# Patient Record
Sex: MALE | Race: WHITE | NOT HISPANIC OR LATINO | Employment: OTHER | URBAN - METROPOLITAN AREA
[De-identification: names, ages, dates, MRNs, and addresses within clinical notes are randomized per-mention and may not be internally consistent; named-entity substitution may affect disease eponyms.]

---

## 2017-03-08 PROBLEM — Z72.0 TOBACCO ABUSE: Status: ACTIVE | Noted: 2017-03-08

## 2017-03-10 ENCOUNTER — OFFICE VISIT (OUTPATIENT)
Dept: FAMILY MEDICINE CLINIC | Facility: CLINIC | Age: 62
End: 2017-03-10

## 2017-03-10 VITALS
OXYGEN SATURATION: 95 % | SYSTOLIC BLOOD PRESSURE: 140 MMHG | HEIGHT: 70 IN | HEART RATE: 70 BPM | RESPIRATION RATE: 18 BRPM | TEMPERATURE: 97.8 F | WEIGHT: 209 LBS | DIASTOLIC BLOOD PRESSURE: 76 MMHG | BODY MASS INDEX: 29.92 KG/M2

## 2017-03-10 DIAGNOSIS — N40.0 BENIGN NON-NODULAR PROSTATIC HYPERPLASIA, PRESENCE OF LOWER URINARY TRACT SYMPTOMS UNSPECIFIED: ICD-10-CM

## 2017-03-10 DIAGNOSIS — R68.82 DECREASED LIBIDO: ICD-10-CM

## 2017-03-10 DIAGNOSIS — Z12.11 SCREEN FOR COLON CANCER: ICD-10-CM

## 2017-03-10 DIAGNOSIS — R53.83 FATIGUE, UNSPECIFIED TYPE: Primary | ICD-10-CM

## 2017-03-10 DIAGNOSIS — E78.2 MIXED HYPERLIPIDEMIA: ICD-10-CM

## 2017-03-10 PROCEDURE — 99214 OFFICE O/P EST MOD 30 MIN: CPT | Performed by: FAMILY MEDICINE

## 2017-03-10 RX ORDER — TAMSULOSIN HYDROCHLORIDE 0.4 MG/1
1 CAPSULE ORAL EVERY MORNING
COMMUNITY
Start: 2015-09-14 | End: 2021-12-30 | Stop reason: SDUPTHER

## 2017-03-10 NOTE — PROGRESS NOTES
Chief Complaint   Patient presents with   • Fatigue     all the time, he would like to discuss why extremely tired all time       Subjective     Fatigue   This is a recurrent problem. The current episode started more than 1 year ago (about 1 yr). Associated symptoms include arthralgias (shoulders and elbows hurt but use them alot as well), fatigue and weakness (general). Pertinent negatives include no abdominal pain, chest pain, congestion, coughing, fever, myalgias, numbness or sore throat. Associated symptoms comments: decreased muscle mass. Shortness of breath. decreased libido/. + ED issues as well.   . Nothing aggravates the symptoms. He has tried nothing for the symptoms. The treatment provided no relief.   Hyperlipidemia   This is a chronic problem. The current episode started more than 1 year ago. The problem is uncontrolled. Recent lipid tests were reviewed and are high. He has no history of chronic renal disease, diabetes or hypothyroidism. Factors aggravating his hyperlipidemia include smoking. Pertinent negatives include no chest pain, myalgias or shortness of breath. He is currently on no antihyperlipidemic treatment. There are no compliance problems.  Risk factors for coronary artery disease include male sex.     tob use  hypnotism worked in the past        PHQ-9 Depression Screening 3/10/2017   Little interest or pleasure in doing things 0   Feeling down, depressed, or hopeless 0   Trouble falling or staying asleep, or sleeping too much 1   Feeling tired or having little energy 3   Poor appetite or overeating 0   Feeling bad about yourself - or that you are a failure or have let yourself or your family down 0   Trouble concentrating on things, such as reading the newspaper or watching television 0   Moving or speaking so slowly that other people could have noticed. Or the opposite - being so fidgety or restless that you have been moving around a lot more than usual 0   Thoughts that you would be  better off dead, or of hurting yourself in some way 0   PHQ-9 Total Score 4      Past Medical History,Medications, Allergies, and social history was reviewed.    Review of Systems   Constitutional: Positive for fatigue. Negative for fever.   HENT: Negative.  Negative for congestion and sore throat.    Eyes: Negative.    Respiratory: Negative.  Negative for cough and shortness of breath.    Cardiovascular: Negative.  Negative for chest pain.   Gastrointestinal: Negative.  Negative for abdominal pain.   Endocrine: Negative.    Genitourinary: Negative.    Musculoskeletal: Positive for arthralgias (shoulders and elbows hurt but use them alot as well). Negative for myalgias.   Neurological: Positive for weakness (general). Negative for numbness.   Psychiatric/Behavioral: Negative.        Objective     Physical Exam   Constitutional: He is oriented to person, place, and time. Vital signs are normal. He appears well-developed and well-nourished.   HENT:   Head: Normocephalic and atraumatic.   Right Ear: Hearing, tympanic membrane, external ear and ear canal normal.   Left Ear: Hearing, tympanic membrane, external ear and ear canal normal.   Nose: Nose normal.   Mouth/Throat: Oropharynx is clear and moist.   Eyes: Conjunctivae, EOM and lids are normal. Pupils are equal, round, and reactive to light.   Neck: Normal range of motion. Neck supple. No thyromegaly present.   Cardiovascular: Normal rate, regular rhythm and normal heart sounds.  Exam reveals no friction rub.    No murmur heard.  Pulmonary/Chest: Effort normal and breath sounds normal. No respiratory distress. He has no wheezes. He has no rales.   Abdominal: Soft. Normal appearance and bowel sounds are normal. He exhibits no distension and no mass. There is no tenderness. There is no rebound and no guarding.   Neurological: He is alert and oriented to person, place, and time. He has normal strength.   Skin: Skin is warm and dry.   Psychiatric: He has a normal mood  and affect. His speech is normal and behavior is normal. Cognition and memory are normal.   Nursing note and vitals reviewed.        Assessment/Plan     Problem List Items Addressed This Visit        Cardiovascular and Mediastinum    Mixed hyperlipidemia    Relevant Orders    Comprehensive Metabolic Panel    Lipid Panel With / Chol / HDL Ratio       Genitourinary    Benign non-nodular prostatic hyperplasia    Relevant Medications    tamsulosin (FLOMAX) 0.4 MG capsule 24 hr capsule      Other Visit Diagnoses     Fatigue, unspecified type    -  Primary    Relevant Orders    CBC & Differential    Comprehensive Metabolic Panel    TSH    Testosterone    Vitamin B12    Decreased libido        Relevant Orders    Testosterone    Screen for colon cancer        Relevant Orders    Amb referral for Screening Colonoscopy              DISCUSSION  Persistent fatigue with associated decreased libido.  Check labs as noted.  May have hypogonadism.  Was mildly low but in the normal range in 2015.    BPH.  Stable.  Continue medication.    Elevated triglycerides and cholesterol.  Recheck lipid panel and CMP.    Further plan once we have labs back.    Recommend continue efforts to quit smoking.    Recommend colonoscopy for screening.    MEDICATIONS PRESCRIBED  Requested Prescriptions      No prescriptions requested or ordered in this encounter          Héctor Olivas MD

## 2017-03-11 LAB
ALBUMIN SERPL-MCNC: 4.5 G/DL (ref 3.2–4.8)
ALBUMIN/GLOB SERPL: 1.6 G/DL (ref 1.5–2.5)
ALP SERPL-CCNC: 88 U/L (ref 25–100)
ALT SERPL-CCNC: 30 U/L (ref 7–40)
AST SERPL-CCNC: 24 U/L (ref 0–33)
BASOPHILS # BLD AUTO: 0.05 10*3/MM3 (ref 0–0.2)
BASOPHILS NFR BLD AUTO: 0.5 % (ref 0–1)
BILIRUB SERPL-MCNC: 0.3 MG/DL (ref 0.3–1.2)
BUN SERPL-MCNC: 13 MG/DL (ref 9–23)
BUN/CREAT SERPL: 16.3 (ref 7–25)
CALCIUM SERPL-MCNC: 10 MG/DL (ref 8.7–10.4)
CHLORIDE SERPL-SCNC: 109 MMOL/L (ref 99–109)
CHOLEST SERPL-MCNC: 225 MG/DL (ref 0–200)
CHOLEST/HDLC SERPL: 4.25 {RATIO}
CO2 SERPL-SCNC: 28 MMOL/L (ref 20–31)
CONV COMMENT: ABNORMAL
CREAT SERPL-MCNC: 0.8 MG/DL (ref 0.6–1.3)
EOSINOPHIL # BLD AUTO: 0.19 10*3/MM3 (ref 0.1–0.3)
EOSINOPHIL NFR BLD AUTO: 1.9 % (ref 0–3)
ERYTHROCYTE [DISTWIDTH] IN BLOOD BY AUTOMATED COUNT: 13.5 % (ref 11.3–14.5)
GLOBULIN SER CALC-MCNC: 2.9 GM/DL
GLUCOSE SERPL-MCNC: 95 MG/DL (ref 70–100)
HCT VFR BLD AUTO: 48.8 % (ref 38.9–50.9)
HDLC SERPL-MCNC: 53 MG/DL (ref 40–60)
HGB BLD-MCNC: 16.2 G/DL (ref 13.1–17.5)
IMM GRANULOCYTES # BLD: 0.03 10*3/MM3 (ref 0–0.03)
IMM GRANULOCYTES NFR BLD: 0.3 % (ref 0–0.6)
LDLC SERPL CALC-MCNC: 152 MG/DL (ref 0–100)
LYMPHOCYTES # BLD AUTO: 3.27 10*3/MM3 (ref 0.6–4.8)
LYMPHOCYTES NFR BLD AUTO: 32.2 % (ref 24–44)
MCH RBC QN AUTO: 31.6 PG (ref 27–31)
MCHC RBC AUTO-ENTMCNC: 33.2 G/DL (ref 32–36)
MCV RBC AUTO: 95.1 FL (ref 80–99)
MONOCYTES # BLD AUTO: 0.83 10*3/MM3 (ref 0–1)
MONOCYTES NFR BLD AUTO: 8.2 % (ref 0–12)
NEUTROPHILS # BLD AUTO: 5.78 10*3/MM3 (ref 1.5–8.3)
NEUTROPHILS NFR BLD AUTO: 56.9 % (ref 41–71)
PLATELET # BLD AUTO: 268 10*3/MM3 (ref 150–450)
POTASSIUM SERPL-SCNC: 4.7 MMOL/L (ref 3.5–5.5)
PROT SERPL-MCNC: 7.4 G/DL (ref 5.7–8.2)
RBC # BLD AUTO: 5.13 10*6/MM3 (ref 4.2–5.76)
SODIUM SERPL-SCNC: 142 MMOL/L (ref 132–146)
TESTOST SERPL-MCNC: 298 NG/DL (ref 348–1197)
TRIGL SERPL-MCNC: 101 MG/DL (ref 0–150)
TSH SERPL DL<=0.005 MIU/L-ACNC: 1.73 MIU/ML (ref 0.35–5.35)
VIT B12 SERPL-MCNC: 286 PG/ML (ref 211–911)
VLDLC SERPL CALC-MCNC: 20.2 MG/DL
WBC # BLD AUTO: 10.15 10*3/MM3 (ref 3.5–10.8)

## 2017-03-14 DIAGNOSIS — R79.89 LOW TESTOSTERONE: Primary | ICD-10-CM

## 2017-03-15 LAB
CONV COMMENT: ABNORMAL
LH SERPL-ACNC: 6.3 MIU/ML (ref 1.7–8.6)
PSA SERPL-MCNC: 3.8 NG/ML (ref 0–4)
TESTOST SERPL-MCNC: 324 NG/DL (ref 348–1197)

## 2017-03-17 NOTE — PROGRESS NOTES
Spoke with pt's wife (On consent) and went over results/instructions. Verbalized understanding. Rx called

## 2017-03-23 RX ORDER — TESTOSTERONE 30 MG/1.5ML
SOLUTION TOPICAL
Qty: 90 ML | Refills: 2 | OUTPATIENT
Start: 2017-03-23 | End: 2021-08-19

## 2017-04-04 PROBLEM — E29.1 HYPOGONADISM IN MALE: Status: ACTIVE | Noted: 2017-04-04

## 2017-04-04 PROBLEM — R68.82 REDUCED LIBIDO: Status: ACTIVE | Noted: 2017-04-04

## 2017-04-04 PROBLEM — R53.83 FATIGUE: Status: ACTIVE | Noted: 2017-04-04

## 2017-04-04 PROBLEM — R31.9 HEMATURIA: Status: ACTIVE | Noted: 2017-04-04

## 2018-07-03 ENCOUNTER — OFFICE VISIT (OUTPATIENT)
Dept: RETAIL CLINIC | Facility: CLINIC | Age: 63
End: 2018-07-03

## 2018-07-03 VITALS
WEIGHT: 208 LBS | HEIGHT: 70 IN | HEART RATE: 69 BPM | TEMPERATURE: 98.7 F | OXYGEN SATURATION: 98 % | BODY MASS INDEX: 29.78 KG/M2 | RESPIRATION RATE: 12 BRPM | SYSTOLIC BLOOD PRESSURE: 126 MMHG | DIASTOLIC BLOOD PRESSURE: 78 MMHG

## 2018-07-03 DIAGNOSIS — R05.3 COUGH, PERSISTENT: ICD-10-CM

## 2018-07-03 DIAGNOSIS — J06.9 UPPER RESPIRATORY TRACT INFECTION, UNSPECIFIED TYPE: Primary | ICD-10-CM

## 2018-07-03 PROCEDURE — 99213 OFFICE O/P EST LOW 20 MIN: CPT | Performed by: NURSE PRACTITIONER

## 2018-07-03 RX ORDER — PREDNISONE 20 MG/1
20 TABLET ORAL DAILY
Qty: 3 TABLET | Refills: 0 | Status: SHIPPED | OUTPATIENT
Start: 2018-07-03 | End: 2018-07-06

## 2018-07-03 RX ORDER — TAMSULOSIN HYDROCHLORIDE 0.4 MG/1
1 CAPSULE ORAL NIGHTLY
COMMUNITY
End: 2021-08-19 | Stop reason: SDUPTHER

## 2018-07-03 RX ORDER — CEPHALEXIN 500 MG/1
500 CAPSULE ORAL 2 TIMES DAILY
Qty: 20 CAPSULE | Refills: 0 | Status: SHIPPED | OUTPATIENT
Start: 2018-07-03 | End: 2018-07-13

## 2018-07-03 RX ORDER — ASPIRIN 81 MG/1
81 TABLET ORAL DAILY
COMMUNITY
End: 2021-08-19 | Stop reason: SDUPTHER

## 2018-07-03 RX ORDER — PREDNISONE 10 MG/1
TABLET ORAL
Qty: 21 TABLET | Refills: 0 | Status: SHIPPED | OUTPATIENT
Start: 2018-07-03 | End: 2018-07-03

## 2018-07-03 RX ORDER — ALBUTEROL SULFATE 90 UG/1
2 AEROSOL, METERED RESPIRATORY (INHALATION) EVERY 4 HOURS PRN
Qty: 1 INHALER | Refills: 0 | Status: SHIPPED | OUTPATIENT
Start: 2018-07-03 | End: 2021-08-19

## 2018-07-03 NOTE — PATIENT INSTRUCTIONS
"Upper Respiratory Infection, Adult  Most upper respiratory infections (URIs) are a viral infection of the air passages leading to the lungs. A URI affects the nose, throat, and upper air passages. The most common type of URI is nasopharyngitis and is typically referred to as \"the common cold.\"  URIs run their course and usually go away on their own. Most of the time, a URI does not require medical attention, but sometimes a bacterial infection in the upper airways can follow a viral infection. This is called a secondary infection. Sinus and middle ear infections are common types of secondary upper respiratory infections.  Bacterial pneumonia can also complicate a URI. A URI can worsen asthma and chronic obstructive pulmonary disease (COPD). Sometimes, these complications can require emergency medical care and may be life threatening.  What are the causes?  Almost all URIs are caused by viruses. A virus is a type of germ and can spread from one person to another.  What increases the risk?  You may be at risk for a URI if:  · You smoke.  · You have chronic heart or lung disease.  · You have a weakened defense (immune) system.  · You are very young or very old.  · You have nasal allergies or asthma.  · You work in crowded or poorly ventilated areas.  · You work in health care facilities or schools.    What are the signs or symptoms?  Symptoms typically develop 2-3 days after you come in contact with a cold virus. Most viral URIs last 7-10 days. However, viral URIs from the influenza virus (flu virus) can last 14-18 days and are typically more severe. Symptoms may include:  · Runny or stuffy (congested) nose.  · Sneezing.  · Cough.  · Sore throat.  · Headache.  · Fatigue.  · Fever.  · Loss of appetite.  · Pain in your forehead, behind your eyes, and over your cheekbones (sinus pain).  · Muscle aches.    How is this diagnosed?  Your health care provider may diagnose a URI by:  · Physical exam.  · Tests to check that your " symptoms are not due to another condition such as:  ? Strep throat.  ? Sinusitis.  ? Pneumonia.  ? Asthma.    How is this treated?  A URI goes away on its own with time. It cannot be cured with medicines, but medicines may be prescribed or recommended to relieve symptoms. Medicines may help:  · Reduce your fever.  · Reduce your cough.  · Relieve nasal congestion.    Follow these instructions at home:  · Take medicines only as directed by your health care provider.  · Gargle warm saltwater or take cough drops to comfort your throat as directed by your health care provider.  · Use a warm mist humidifier or inhale steam from a shower to increase air moisture. This may make it easier to breathe.  · Drink enough fluid to keep your urine clear or pale yellow.  · Eat soups and other clear broths and maintain good nutrition.  · Rest as needed.  · Return to work when your temperature has returned to normal or as your health care provider advises. You may need to stay home longer to avoid infecting others. You can also use a face mask and careful hand washing to prevent spread of the virus.  · Increase the usage of your inhaler if you have asthma.  · Do not use any tobacco products, including cigarettes, chewing tobacco, or electronic cigarettes. If you need help quitting, ask your health care provider.  How is this prevented?  The best way to protect yourself from getting a cold is to practice good hygiene.  · Avoid oral or hand contact with people with cold symptoms.  · Wash your hands often if contact occurs.    There is no clear evidence that vitamin C, vitamin E, echinacea, or exercise reduces the chance of developing a cold. However, it is always recommended to get plenty of rest, exercise, and practice good nutrition.  Contact a health care provider if:  · You are getting worse rather than better.  · Your symptoms are not controlled by medicine.  · You have chills.  · You have worsening shortness of breath.  · You have  brown or red mucus.  · You have yellow or brown nasal discharge.  · You have pain in your face, especially when you bend forward.  · You have a fever.  · You have swollen neck glands.  · You have pain while swallowing.  · You have white areas in the back of your throat.  Get help right away if:  · You have severe or persistent:  ? Headache.  ? Ear pain.  ? Sinus pain.  ? Chest pain.  · You have chronic lung disease and any of the following:  ? Wheezing.  ? Prolonged cough.  ? Coughing up blood.  ? A change in your usual mucus.  · You have a stiff neck.  · You have changes in your:  ? Vision.  ? Hearing.  ? Thinking.  ? Mood.  This information is not intended to replace advice given to you by your health care provider. Make sure you discuss any questions you have with your health care provider.  Document Released: 06/13/2002 Document Revised: 08/20/2017 Document Reviewed: 03/25/2015  [email or web address] Interactive Patient Education © 2018 [email or web address]    How to Use a Metered Dose Inhaler  A metered dose inhaler is a handheld device for taking medicine that must be breathed into the lungs (inhaled). The device can be used to deliver a variety of inhaled medicines, including:  · Quick relief or rescue medicines, such as bronchodilators.  · Controller medicines, such as corticosteroids.    The medicine is delivered by pushing down on a metal canister to release a preset amount of spray and medicine. Each device contains the amount of medicine that is needed for a preset number of uses (inhalations).  Your health care provider may recommend that you use a spacer with your inhaler to help you take the medicine more effectively. A spacer is a plastic tube with a mouthpiece on one end and an opening that connects to the inhaler on the other end. A spacer holds the medicine in a tube for a short time, which allows you to inhale more medicine.  What are the risks?  If you do not use your inhaler correctly, medicine might not reach  your lungs to help you breathe.  Inhaler medicine can cause side effects, such as:  · Mouth or throat infection.  · Cough.  · Hoarseness.  · Headache.  · Nausea and vomiting.  · Lung infection (pneumonia) in people who have a lung condition called COPD.    How to use a metered dose inhaler without a spacer  1. Remove the cap from the inhaler.  2. If you are using the inhaler for the first time, shake it for 5 seconds, turn it away from your face, then release 4 puffs into the air. This is called priming.  3. Shake the inhaler for 5 seconds.  4. Position the inhaler so the top of the canister faces up.  5. Put your index finger on the top of the medicine canister. Support the bottom of the inhaler with your thumb.  6. Breathe out normally and as completely as possible, away from the inhaler.  7. Either place the inhaler between your teeth and close your lips tightly around the mouthpiece, or hold the inhaler 1-2 inches (2.5-5 cm) away from your open mouth. Keep your tongue down out of the way. If you are unsure which technique to use, ask your health care provider.  8. Press the canister down with your index finger to release the medicine, then inhale deeply and slowly through your mouth (not your nose) until your lungs are completely filled. Inhaling should take 4-6 seconds.  9. Hold the medicine in your lungs for 5-10 seconds (10 seconds is best). This helps the medicine get into the small airways of your lungs.  10. With your lips in a tight [place] (pursed), breathe out slowly.  11. Repeat steps 3-10 until you have taken the number of puffs that your health care provider directed. Wait about 1 minute between puffs or as directed.  12. Put the cap on the inhaler.  13. If you are using a steroid inhaler, rinse your mouth with water, gargle, and spit out the water. Do not swallow the water.  How to use a metered dose inhaler with a spacer  1. Remove the cap from the inhaler.  2. If you are using the inhaler for the  first time, shake it for 5 seconds, turn it away from your face, then release 4 puffs into the air. This is called priming.  3. Shake the inhaler for 5 seconds.  4. Place the open end of the spacer onto the inhaler mouthpiece.  5. Position the inhaler so the top of the canister faces up and the spacer mouthpiece faces you.  6. Put your index finger on the top of the medicine canister. Support the bottom of the inhaler and the spacer with your thumb.  7. Breathe out normally and as completely as possible, away from the spacer.  8. Place the spacer between your teeth and close your lips tightly around it. Keep your tongue down out of the way.  9. Press the canister down with your index finger to release the medicine, then inhale deeply and slowly through your mouth (not your nose) until your lungs are completely filled. Inhaling should take 4-6 seconds.  10. Hold the medicine in your lungs for 5-10 seconds (10 seconds is best). This helps the medicine get into the small airways of your lungs.  11. With your lips in a tight [place] (pursed), breathe out slowly.  12. Repeat steps 3-11 until you have taken the number of puffs that your health care provider directed. Wait about 1 minute between puffs or as directed.  13. Remove the spacer from the inhaler and put the cap on the inhaler.  14. If you are using a steroid inhaler, rinse your mouth with water, gargle, and spit out the water. Do not swallow the water.  Follow these instructions at home:  · Take your inhaled medicine only as told by your health care provider. Do not use the inhaler more than directed by your health care provider.  · Keep all follow-up visits as told by your health care provider. This is important.  · If your inhaler has a counter, you can check it to determine how full your inhaler is. If your inhaler does not have a counter, ask your health care provider when you will need to refill your inhaler and write the refill date on a calendar or on your  inhaler canister. Note that you cannot know when an inhaler is empty by shaking it.  · Follow directions on the package insert for care and cleaning of your inhaler and spacer.  Contact a health care provider if:  · Symptoms are only partially relieved with your inhaler.  · You are having trouble using your inhaler.  · You have an increase in phlegm.  · You have headaches.  Get help right away if:  · You feel little or no relief after using your inhaler.  · You have dizziness.  · You have a fast heart rate.  · You have chills or a fever.  · You have night sweats.  · There is blood in your phlegm.  Summary  · A metered dose inhaler is a handheld device for taking medicine that must be breathed into the lungs (inhaled).  · The medicine is delivered by pushing down on a metal canister to release a preset amount of spray and medicine.  · Each device contains the amount of medicine that is needed for a preset number of uses (inhalations).  This information is not intended to replace advice given to you by your health care provider. Make sure you discuss any questions you have with your health care provider.  Document Released: 12/18/2006 Document Revised: 11/07/2017 Document Reviewed: 11/07/2017  [employer] Interactive Patient Education © 2017 [employer]

## 2019-05-26 NOTE — PROGRESS NOTES
"Subjective   Angelo Martin is a 62 y.o. male.   /78   Pulse 69   Temp 98.7 °F (37.1 °C) (Oral)   Resp 12   Ht 177.8 cm (70\")   Wt 94.3 kg (208 lb)   SpO2 98%   BMI 29.84 kg/m²   Past Medical History:   Diagnosis Date   • Asthma     resolved as a small child   • Elevated cholesterol      No Known Allergies    URI    This is a new problem. The current episode started 1 to 4 weeks ago. The problem has been gradually worsening. There has been no fever. Associated symptoms include congestion, coughing (productive and croupy like cough), rhinorrhea and a sore throat (waxes and wanes). Pertinent negatives include no abdominal pain or chest pain.        The following portions of the patient's history were reviewed and updated as appropriate: allergies, current medications, past family history, past medical history, past social history, past surgical history and problem list.    Review of Systems   Constitutional: Positive for activity change and fatigue. Negative for appetite change and fever.   HENT: Positive for congestion, postnasal drip, rhinorrhea, sore throat (waxes and wanes) and voice change.    Eyes: Positive for discharge and redness.   Respiratory: Positive for cough (productive and croupy like cough) and shortness of breath.    Cardiovascular: Negative for chest pain.   Gastrointestinal: Negative for abdominal pain.   Musculoskeletal: Positive for myalgias (resolved).       Objective   Physical Exam   Constitutional: He appears well-developed and well-nourished.  Non-toxic appearance. He appears ill (mild).   HENT:   Head: Normocephalic and atraumatic.   Right Ear: Tympanic membrane and ear canal normal.   Left Ear: Tympanic membrane and ear canal normal.   Nose: Nose normal. Right sinus exhibits no maxillary sinus tenderness and no frontal sinus tenderness. Left sinus exhibits no maxillary sinus tenderness and no frontal sinus tenderness.   Mouth/Throat: Uvula is midline. Posterior oropharyngeal " Incentive spirometry  Keep Spo2 >90%  Deep breathing and atelectasis prevention  PRN bronchodilators: short acting     erythema present. No tonsillar exudate.   Thick PND noted   Cardiovascular: Regular rhythm and normal heart sounds.    Pulmonary/Chest: Effort normal. He has no wheezes. He has no rhonchi. He has no rales.   Lymphadenopathy:     He has no cervical adenopathy.   Skin: Skin is warm and dry.       Assessment/Plan   Angelo was seen today for uri.    Diagnoses and all orders for this visit:    Upper respiratory tract infection, unspecified type    Cough, persistent    Other orders  -     cephalexin (KEFLEX) 500 MG capsule; Take 1 capsule by mouth 2 (Two) Times a Day for 10 days.  -     Discontinue: predniSONE (DELTASONE) 10 MG tablet; 6/5/4/3/2/1 As directed PO  -     albuterol (PROVENTIL HFA;VENTOLIN HFA) 108 (90 Base) MCG/ACT inhaler; Inhale 2 puffs Every 4 (Four) Hours As Needed for Wheezing or Shortness of Air.  -     predniSONE (DELTASONE) 20 MG tablet; Take 1 tablet by mouth Daily for 3 days.

## 2021-08-18 ENCOUNTER — TELEPHONE (OUTPATIENT)
Dept: RADIATION ONCOLOGY | Facility: HOSPITAL | Age: 66
End: 2021-08-18

## 2021-08-19 ENCOUNTER — HOSPITAL ENCOUNTER (OUTPATIENT)
Dept: RADIATION ONCOLOGY | Facility: HOSPITAL | Age: 66
Setting detail: RADIATION/ONCOLOGY SERIES
Discharge: HOME | End: 2021-08-19

## 2021-08-19 ENCOUNTER — OFFICE VISIT (OUTPATIENT)
Dept: RADIATION ONCOLOGY | Facility: HOSPITAL | Age: 66
End: 2021-08-19

## 2021-08-19 VITALS
TEMPERATURE: 97.7 F | DIASTOLIC BLOOD PRESSURE: 73 MMHG | RESPIRATION RATE: 18 BRPM | WEIGHT: 203.8 LBS | SYSTOLIC BLOOD PRESSURE: 150 MMHG | BODY MASS INDEX: 29.18 KG/M2 | HEIGHT: 70 IN | HEART RATE: 67 BPM | OXYGEN SATURATION: 96 %

## 2021-08-19 DIAGNOSIS — C61 PROSTATE CANCER (HCC): Primary | ICD-10-CM

## 2021-08-19 PROCEDURE — G0463 HOSPITAL OUTPT CLINIC VISIT: HCPCS | Performed by: RADIOLOGY

## 2021-08-19 NOTE — PROGRESS NOTES
CONSULTATION NOTE      :                                                          1955  DATE OF CONSULTATION:                       2021   REQUESTING PHYSICIAN:                   Hola Bland MD  REASON FOR CONSULTATION:           Prostate Cancer  Cancer Staging  Stage I (cT1c, cN0, cM0, PSA: 9.4, Grade Group: 1)    Thank you for requesting my services in evaluation of this pleasant individual.  I am seeing them in outpatient consultation regarding a diagnosis of prostate cancer.     BRIEF HISTORY:  The patient is a very pleasant 65 y.o. male  with minimal past medical history other than hypercholesterolemia and a remote history of a resected melanoma over 10 years ago, who originally presented to urology several years back with new onset hematuria.  He noted that this began after he did heavy lifting and he was followed within the urology clinic at [facility], and at that time his PSA measured 4.37 NG/mL.  Since that time, his PSA has consistently risen but he did not have any values between 2017 and now, which unfortunately it has been shown to have reached a level of 9.4 NG/mL.  Now under the care of Dr. Bland, he underwent a transrectal ultrasound-guided biopsy which has revealed a Amarilis 3+3 = 6 prostate adenocarcinoma and a single core on the right side of the prostate.  He has been offered several options for treatment including active observation, with the patient elects for definitive treatment who presents today specifically interested in pursuing CyberKnife radiosurgery.  From a symptomatic standpoint, he no longer has any symptoms of hematuria and states that that resolved 4 years ago.  His IPSS score today is 12 with symptoms most notably for urgency more than 50% of the time and incomplete emptying less than 50% of the time.  He is due for colonoscopy and is requesting that we help arrange that now.    Allergy: No Known Allergies    Social History:   Social History      Socioeconomic History   • Marital status:      Spouse name: Not on file   • Number of children: Not on file   • Years of education: Not on file   • Highest education level: Not on file   Tobacco Use   • Smoking status: Current Every Day Smoker     Packs/day: 1.00     Types: Cigarettes   • Smokeless tobacco: Never Used   Substance and Sexual Activity   • Alcohol use: Yes     Alcohol/week: 10.0 standard drinks     Types: 10 Standard drinks or equivalent per week     Comment: occ   • Drug use: No   • Sexual activity: Defer       Past Medical History:   Past Medical History:   Diagnosis Date   • Asthma     resolved as a small child   • Decreased libido    • Elevated cholesterol    • Hematuria     Last Impression: 02 Sep 2015  Gross hematuria  [facility] (Urgent Care)   • Melanoma (CMS/HCC)      back 10 yrs ago. Not seen derm in 2 yrs. No lesions. Children noticed it.    • Prostate cancer (CMS/HCC)        Family History: family history includes Heart disease in his father, mother, and sister; Lung disease in his father; Prostate cancer in his brother.     Surgical History:   Past Surgical History:   Procedure Laterality Date   • PROSTATE BIOPSY     • SKIN BIOPSY      melanoma   • SKIN CANCER EXCISION      melanoma from back  8/2011        Review of Systems:   Review of Systems   HENT:   Positive for hearing loss.    All other systems reviewed and are negative.           IPSS Questionnaire (AUA-7):  Over the past month…    1)  Incomplete Emptying  How often have you had a sensation of not emptying your bladder?  2 - Less than half the time   2)  Frequency  How often have you had to urinate less than every two hours? 1 - Less than 1 time in 5   3)  Intermittency  How often have you found you stopped and started again several times when you urinated?  1 - Less than 1 time in 5   4) Urgency  How often have you found it difficult to postpone urination?  4 - More than half the time   5) Weak Stream  How often  "have you had a weak urinary stream?  2 - Less than half the time   6) Straining  How often have you had to push or strain to begin urination?  1 - Less than 1 time in 5   7) Nocturia  How many times did you typically get up at night to urinate?  1 - 1 time   Total Score:  12       Quality of life due to urinary symptoms:  If you were to spend the rest of your life with your urinary condition the way it is now, how would you feel about that? 2-Mostly Satisfied   Urine Leakage (Incontinence) 0-No Leakage     Sexual Health Inventory  Current Status    1)  How do you rate your confidence that you could achieve and keep an erection? 1-Very Low   2) When you had erections with sexual stimulation, how often were your erections hard enough for penetration (entering your partner)? 1-Almost never or never   3)  During sexual intercourse, how often were you able to maintain your erection after you had penetrated (entered) into your partner? 1-Almost never or never   4) During sexual intercourse, how difficult was it to maintain your erection to completion of intercourse? 1-Extremely difficult   5) When you attempted sexual intercourse, how often was it satisfactory to you? 2-A few times (much less than half the time)   Total Score: 6       Bowel Health Inventory  Current Status: 0-No problems, no rectal bleeding, no discharge, less then 5 bowel movements a day             Objective   VITAL SIGNS:   Vitals:    08/19/21 1308   BP: 150/73   Pulse: 67   Resp: 18   Temp: 97.7 °F (36.5 °C)   TempSrc: Temporal   SpO2: 96%   Weight: 92.4 kg (203 lb 12.8 oz)   Height: 177.8 cm (70\")   PainSc: 0-No pain        KPS 80%    Physical Exam:   Physical Exam  Vitals and nursing note reviewed.   Constitutional:       General: He is not in acute distress.     Appearance: He is well-developed.   HENT:      Head: Normocephalic and atraumatic.   Eyes:      Conjunctiva/sclera: Conjunctivae normal.      Pupils: Pupils are equal, round, and reactive " to light.   Cardiovascular:      Rate and Rhythm: Normal rate and regular rhythm.      Heart sounds: No murmur heard.   No friction rub.   Pulmonary:      Effort: Pulmonary effort is normal.      Breath sounds: Normal breath sounds. No wheezing.   Abdominal:      General: Bowel sounds are normal. There is no distension.      Palpations: Abdomen is soft. There is no mass.      Tenderness: There is no abdominal tenderness.   Genitourinary:     Comments: 60-70 g without nodules  Musculoskeletal:         General: Normal range of motion.      Cervical back: Normal range of motion and neck supple.   Lymphadenopathy:      Cervical: No cervical adenopathy.   Skin:     General: Skin is warm and dry.   Neurological:      Mental Status: He is alert and oriented to person, place, and time.   Psychiatric:         Behavior: Behavior normal.         Thought Content: Thought content normal.         Judgment: Judgment normal.     PATHOLOGY  Transrectal ultrasound-guided biopsy of the prostate 8/2/2021:  Right side: Amarilis 3+3 = 6 prostate adenocarcinoma involving a single core, involving less than 5% of that core  Left side: Benign    LABORATORY  PSA 10/2016: 4.37 NG/mL  PSA 10/2017: 5.07 NG/mL  PSA 5/18/2021: 9.4 NG/mL         The following portions of the patient's history were reviewed and updated as appropriate: allergies, current medications, past family history, past medical history, past social history, past surgical history and problem list.    Assessment:   Assessment  Mr. Martin is a 65-year-old gentleman who presents now for evaluation of a clinical T1c, Amarilis 3+3 = 6 prostate adenocarcinoma with a pretreatment PSA of 9.4 NG/mL.   I spent approximately 45 minutes today with the patient and his spouse, discussing the different treatment options for an early stage favorable risk prostate cancer.  Specifically, we covered active observation including exploration of the threshold for treatment as well as further evaluation,  and we discussed radical prostatectomy, interstitial brachytherapy, a standard course of fractionated radiation therapy, and stereotactic body radiation therapy using the CyberKnife treatment unit, with a discussion regarding the logistics, and short term and long term risks of each modality.  He was most interested today in treatment using stereotactic body radiation therapy, and after a full explanation of the risks and benefits, he signed informed consent.  He will need to have fiducials placed, so I will coordinate for him to be seen again by Dr. Bland for this.  In general, we need 4-5 fiducial markers in order for the Cyberknife to accurately track the prostate during treatment.  Once his fiducials have been placed, I will coordinate for him to return to my clinic for re-evaluation.  On that day, he will complete an MRI of the Prostate, and a Cyberknife planning session.  I anticipate treating his prostate to 35Gy in 5 fractions of 7Gy each.  I also discussed the necessary bowel prep, low fiber and gas diet, and using alpha blockers during treatment.      He is in otherwise excellent shape with very good performance status, and a calculated healthy life expectancy that exceeds 20 years, therefore I do believe treatment is warranted.      RECOMMENDATIONS:    1.  Plan for CyberKnife radiosurgery to the prostate, 35 Gy in 5 fractions.  2.  Referral for gold seed fiducial placement    Follow Up:   Return in about 3 weeks (around 9/9/2021) for Office Visit, Imaging - See orders, Radiation Simulation.  Diagnoses and all orders for this visit:    1. Prostate cancer (CMS/HCC) (Primary)    Thank you for allowing me to participate in the care of this individual.    Sincerely,       Abran Lorenzo MD

## 2021-08-20 ENCOUNTER — TELEPHONE (OUTPATIENT)
Dept: RADIATION ONCOLOGY | Facility: HOSPITAL | Age: 66
End: 2021-08-20

## 2021-08-20 DIAGNOSIS — C61 PROSTATE CANCER (HCC): Primary | ICD-10-CM

## 2021-08-20 NOTE — TELEPHONE ENCOUNTER
Left VM for pt with the following appts:  9/3/21-Markers with Dr. Bland ( office to contact with instructions/directions)  9/13/21 @ 10:00 clinic  @ 11:00 ct/ck sim  @ 12:45 MRI  Instructed to be NPO for 6 hours prior to MRI and to perform an enema the morning of 9/13/21  Instructed to start gas-eliminating duet with gas-x 4 times per day starting 9/11/21    Referral to Brandy Taylor RD

## 2021-08-24 DIAGNOSIS — Z12.11 SCREENING FOR COLON CANCER: Primary | ICD-10-CM

## 2021-08-24 RX ORDER — SODIUM, POTASSIUM,MAG SULFATES 17.5-3.13G
1 SOLUTION, RECONSTITUTED, ORAL ORAL TAKE AS DIRECTED
Qty: 354 ML | Refills: 0 | Status: SHIPPED | OUTPATIENT
Start: 2021-08-24 | End: 2021-10-12

## 2021-09-01 ENCOUNTER — TELEPHONE (OUTPATIENT)
Dept: RADIATION ONCOLOGY | Facility: HOSPITAL | Age: 66
End: 2021-09-01

## 2021-09-01 NOTE — TELEPHONE ENCOUNTER
Pt wife called stating marker appt. Re-scheduled to 9/24/21-  Left pt wife VM that re-eval/sim re-scheduled to 10/4/21 @ 1:00/2:00/3:00 MRI  Instructed to be NPO for 6 hours prior to MRI and to perform an enema the morning of 10/4/21.  Instructed to start gas-eliminating diet with gas-x 4 times per day on 10/2/21    Message to Brandy Taylor RD r/t re-schedule date.

## 2021-09-13 ENCOUNTER — APPOINTMENT (OUTPATIENT)
Dept: MRI IMAGING | Facility: HOSPITAL | Age: 66
End: 2021-09-13

## 2021-09-14 ENCOUNTER — OUTSIDE FACILITY SERVICE (OUTPATIENT)
Dept: GASTROENTEROLOGY | Facility: CLINIC | Age: 66
End: 2021-09-14

## 2021-09-14 PROCEDURE — 45380 COLONOSCOPY AND BIOPSY: CPT | Performed by: INTERNAL MEDICINE

## 2021-09-14 PROCEDURE — 45385 COLONOSCOPY W/LESION REMOVAL: CPT | Performed by: INTERNAL MEDICINE

## 2021-09-14 PROCEDURE — 88305 TISSUE EXAM BY PATHOLOGIST: CPT | Performed by: INTERNAL MEDICINE

## 2021-09-15 ENCOUNTER — LAB REQUISITION (OUTPATIENT)
Dept: LAB | Facility: HOSPITAL | Age: 66
End: 2021-09-15

## 2021-09-15 DIAGNOSIS — K57.30 DIVERTICULOSIS OF LARGE INTESTINE WITHOUT PERFORATION OR ABSCESS WITHOUT BLEEDING: ICD-10-CM

## 2021-09-15 DIAGNOSIS — K63.5 POLYP OF COLON: ICD-10-CM

## 2021-09-15 DIAGNOSIS — Z12.11 ENCOUNTER FOR SCREENING FOR MALIGNANT NEOPLASM OF COLON: ICD-10-CM

## 2021-09-15 DIAGNOSIS — K64.8 OTHER HEMORRHOIDS: ICD-10-CM

## 2021-09-16 LAB
CYTO UR: NORMAL
LAB AP CASE REPORT: NORMAL
LAB AP CLINICAL INFORMATION: NORMAL
LAB AP DIAGNOSIS COMMENT: NORMAL
PATH REPORT.FINAL DX SPEC: NORMAL
PATH REPORT.GROSS SPEC: NORMAL

## 2021-09-17 ENCOUNTER — TELEPHONE (OUTPATIENT)
Dept: GASTROENTEROLOGY | Facility: CLINIC | Age: 66
End: 2021-09-17

## 2021-09-17 NOTE — TELEPHONE ENCOUNTER
I called and discussed the pathology with Mr. Martin.  He did have several adenoma type polyps.  However, the polypoid lesion in the proximal ascending colon was a tubulovillous adenoma.  This lesion is at least 3 cm in size and likely closer to 4 cm.  I discussed with him the options which include laparoscopic right colon resection versus possible EMR.  I discussed the benefits of both approaches as well as the risk.  The patient would like to discuss further with his wife.  I also reached out to Dr. Lorenzo to discuss the treatment for prostate cancer and if there is an issue with proceeding on with radiation treatment prior to further management of the polypoid lesion.

## 2021-09-20 ENCOUNTER — TELEPHONE (OUTPATIENT)
Dept: GASTROENTEROLOGY | Facility: CLINIC | Age: 66
End: 2021-09-20

## 2021-09-20 NOTE — TELEPHONE ENCOUNTER
I called and spoke with La Nena Mario regarding the findings on the pathology from Mr. Bacon.  He does have a tubulovillous adenoma in the proximal ascending colon that is over 3 cm.The base is broad.  I spoke also with Dr. Lorenzo who is the radiation oncologist. The plan is for  CyberKnife treatment.  The order of management for this lesion will be dependent upon the surgical perspective if CyberKnife for 5 days can take place prior to the right colon resection.

## 2021-09-30 ENCOUNTER — DOCUMENTATION (OUTPATIENT)
Dept: NUTRITION | Facility: HOSPITAL | Age: 66
End: 2021-09-30

## 2021-10-04 ENCOUNTER — HOSPITAL ENCOUNTER (OUTPATIENT)
Dept: RADIATION ONCOLOGY | Facility: HOSPITAL | Age: 66
Setting detail: RADIATION/ONCOLOGY SERIES
Discharge: HOME | End: 2021-10-04

## 2021-10-04 ENCOUNTER — HOSPITAL ENCOUNTER (OUTPATIENT)
Dept: MRI IMAGING | Facility: HOSPITAL | Age: 66
Discharge: HOME | End: 2021-10-04
Admitting: RADIOLOGY

## 2021-10-04 ENCOUNTER — OFFICE VISIT (OUTPATIENT)
Dept: RADIATION ONCOLOGY | Facility: HOSPITAL | Age: 66
End: 2021-10-04

## 2021-10-04 VITALS
SYSTOLIC BLOOD PRESSURE: 153 MMHG | DIASTOLIC BLOOD PRESSURE: 67 MMHG | HEART RATE: 64 BPM | TEMPERATURE: 96.8 F | OXYGEN SATURATION: 97 % | BODY MASS INDEX: 28.71 KG/M2 | RESPIRATION RATE: 18 BRPM | WEIGHT: 200.5 LBS | HEIGHT: 70 IN

## 2021-10-04 DIAGNOSIS — C61 PROSTATE CANCER (HCC): ICD-10-CM

## 2021-10-04 PROCEDURE — 72195 MRI PELVIS W/O DYE: CPT

## 2021-10-04 PROCEDURE — G0463 HOSPITAL OUTPT CLINIC VISIT: HCPCS | Performed by: RADIOLOGY

## 2021-10-04 NOTE — PROGRESS NOTES
RE-EVALUATION    PATIENT:                                                      Angelo Martin  :                                                          1955  DATE:                          10/4/2021   DIAGNOSIS:     Prostate cancer (HCC)  - Stage I (cT1c, cN0, cM0, PSA: 9.4, Grade Group: 1)     BRIEF HISTORY:  The patient is a very pleasant 65 y.o. male  with an early stage, favorable and low risk prostate cancer.  I last saw him a little over a month ago when we discussed several different treatment options and he was most interested in pursuing CyberKnife radiosurgery.  He had not yet underwent a colonoscopy, therefore I have recommended that he undergo screening, which unfortunately revealed a sessile polyp in the ascending colon measuring 3 cm that on pathology was consistent with a tubulovillous adenoma.  He has never been referred for surgical auscultation, where he was evaluated by Dr. Macias and is now scheduled to undergo a laparoscopic right hemicolectomy on .  In the meantime, he has underwent gold seed fiducial placement and returns to my clinic today for reevaluation.    No Known Allergies    Review of Systems   All other systems reviewed and are negative.           IPSS Questionnaire (AUA-7):  Over the past month…     1)  Incomplete Emptying  How often have you had a sensation of not emptying your bladder?  2 - Less than half the time   2)  Frequency  How often have you had to urinate less than every two hours? 1 - Less than 1 time in 5   3)  Intermittency  How often have you found you stopped and started again several times when you urinated?  1 - Less than 1 time in 5   4) Urgency  How often have you found it difficult to postpone urination?  4 - More than half the time   5) Weak Stream  How often have you had a weak urinary stream?  2 - Less than half the time   6) Straining  How often have you had to push or strain to begin urination?  1 - Less than 1 time in 5   7)  "Nocturia  How many times did you typically get up at night to urinate?  1 - 1 time   Total Score:  12         Quality of life due to urinary symptoms:  If you were to spend the rest of your life with your urinary condition the way it is now, how would you feel about that? 2-Mostly Satisfied   Urine Leakage (Incontinence) 0-No Leakage      Sexual Health Inventory  Current Status     1)  How do you rate your confidence that you could achieve and keep an erection? 1-Very Low   2) When you had erections with sexual stimulation, how often were your erections hard enough for penetration (entering your partner)? 1-Almost never or never   3)  During sexual intercourse, how often were you able to maintain your erection after you had penetrated (entered) into your partner? 1-Almost never or never   4) During sexual intercourse, how difficult was it to maintain your erection to completion of intercourse? 1-Extremely difficult   5) When you attempted sexual intercourse, how often was it satisfactory to you? 2-A few times (much less than half the time)   Total Score: 6         Bowel Health Inventory  Current Status: 0-No problems, no rectal bleeding, no discharge, less then 5 bowel movements a day                        Objective   VITAL SIGNS:   Vitals:    10/04/21 1316   BP: 153/67   Pulse: 64   Resp: 18   Temp: 96.8 °F (36 °C)   TempSrc: Temporal   SpO2: 97%   Weight: 90.9 kg (200 lb 8 oz)   Height: 177.8 cm (70\")   PainSc: 0-No pain        Karnofsky score: 80     Physical Exam  Vitals and nursing note reviewed.   Constitutional:       General: He is not in acute distress.     Appearance: He is well-developed.   HENT:      Head: Normocephalic and atraumatic.   Eyes:      Conjunctiva/sclera: Conjunctivae normal.      Pupils: Pupils are equal, round, and reactive to light.   Cardiovascular:      Rate and Rhythm: Normal rate and regular rhythm.      Heart sounds: No murmur heard.   No friction rub.   Pulmonary:      Effort: " Pulmonary effort is normal.      Breath sounds: Normal breath sounds. No wheezing.   Abdominal:      General: Bowel sounds are normal. There is no distension.      Palpations: Abdomen is soft. There is no mass.      Tenderness: There is no abdominal tenderness.   Musculoskeletal:         General: Normal range of motion.      Cervical back: Normal range of motion and neck supple.   Lymphadenopathy:      Cervical: No cervical adenopathy.   Skin:     General: Skin is warm and dry.   Neurological:      Mental Status: He is alert and oriented to person, place, and time.   Psychiatric:         Behavior: Behavior normal.         Thought Content: Thought content normal.         Judgment: Judgment normal.     PATHOLOGY  Colonoscopy with biopsy and excision of polyps 9/14/2021:  1.  CECUM POLYP:  Tubular adenoma.  No high-grade dysplasia identified.    2.  ASCENDING COLON MASS:  Fragments of tubulovillous adenoma.  No high-grade dysplasia or invasive malignancy identified in these biopsy fragments (see comment).    3.  TRANSVERSE COLON POLYP:  Tubular adenoma.  No high-grade dysplasia identified.    4.  SIGMOID COLON POLYPS:  Portions of tubular adenoma(s).  No high-grade dysplasia identified.    5.  RECTOSIGMOID COLON POLYP:  Hyperplastic polyp.       The following portions of the patient's history were reviewed and updated as appropriate: allergies, current medications, past family history, past medical history, past social history, past surgical history and problem list.    Diagnoses and all orders for this visit:    Prostate cancer (HCC)      IMPRESSION: Mr. Martin is a 65-year-old gentleman with an early stage, clinical T1c, Amarilis 3+3 = 6 prostate adenocarcinoma with a pretreatment PSA of 9.4 NG/mL.  Our overall plan is for him to receive CyberKnife radiosurgery.  However, I think would be appropriate for him to undergo surgical resection of the right colon that is needed and recover, prior to beginning radiation.  I  have scheduled him to return to my clinic during the week of October 25 for reevaluation, and at that time we will complete all of the necessary radiation treatment planning steps and once I give approval by Dr. Macias, we will proceed forward likely in the beginning of November and get his prostate cancer treated    RECOMMENDATIONS:    1. Patient to undergo laporascopic right hemicolectomy with Dr. Macias on 10/12/2021.  2. Return to clinic in 4 weeks for re-evaluation and to completion radiation treatment planning.  I anticipate beginning Cyberknife treatments in early November.         Abran Lorenzo MD

## 2021-10-06 ENCOUNTER — APPOINTMENT (OUTPATIENT)
Dept: PREADMISSION TESTING | Facility: HOSPITAL | Age: 66
End: 2021-10-06

## 2021-10-07 ENCOUNTER — PRE-ADMISSION TESTING (OUTPATIENT)
Dept: PREADMISSION TESTING | Facility: HOSPITAL | Age: 66
End: 2021-10-07

## 2021-10-07 VITALS — HEIGHT: 70 IN | BODY MASS INDEX: 29.26 KG/M2 | WEIGHT: 204.37 LBS

## 2021-10-07 LAB
ANION GAP SERPL CALCULATED.3IONS-SCNC: 11 MMOL/L (ref 5–15)
BUN SERPL-MCNC: 15 MG/DL (ref 8–23)
BUN/CREAT SERPL: 19 (ref 7–25)
CALCIUM SPEC-SCNC: 9.4 MG/DL (ref 8.6–10.5)
CEA SERPL-MCNC: 0.87 NG/ML
CHLORIDE SERPL-SCNC: 105 MMOL/L (ref 98–107)
CO2 SERPL-SCNC: 25 MMOL/L (ref 22–29)
CREAT SERPL-MCNC: 0.79 MG/DL (ref 0.76–1.27)
DEPRECATED RDW RBC AUTO: 44.6 FL (ref 37–54)
ERYTHROCYTE [DISTWIDTH] IN BLOOD BY AUTOMATED COUNT: 12.9 % (ref 12.3–15.4)
GFR SERPL CREATININE-BSD FRML MDRD: 99 ML/MIN/1.73
GLUCOSE SERPL-MCNC: 105 MG/DL (ref 65–99)
HBA1C MFR BLD: 6.4 % (ref 4.8–5.6)
HCT VFR BLD AUTO: 47.3 % (ref 37.5–51)
HGB BLD-MCNC: 16.4 G/DL (ref 13–17.7)
MCH RBC QN AUTO: 32.4 PG (ref 26.6–33)
MCHC RBC AUTO-ENTMCNC: 34.7 G/DL (ref 31.5–35.7)
MCV RBC AUTO: 93.5 FL (ref 79–97)
PLATELET # BLD AUTO: 264 10*3/MM3 (ref 140–450)
PMV BLD AUTO: 9.5 FL (ref 6–12)
POTASSIUM SERPL-SCNC: 4.8 MMOL/L (ref 3.5–5.2)
QT INTERVAL: 410 MS
QTC INTERVAL: 429 MS
RBC # BLD AUTO: 5.06 10*6/MM3 (ref 4.14–5.8)
SODIUM SERPL-SCNC: 141 MMOL/L (ref 136–145)
WBC # BLD AUTO: 9.55 10*3/MM3 (ref 3.4–10.8)

## 2021-10-07 PROCEDURE — 80048 BASIC METABOLIC PNL TOTAL CA: CPT

## 2021-10-07 PROCEDURE — 85027 COMPLETE CBC AUTOMATED: CPT

## 2021-10-07 PROCEDURE — 93005 ELECTROCARDIOGRAM TRACING: CPT

## 2021-10-07 PROCEDURE — 36415 COLL VENOUS BLD VENIPUNCTURE: CPT

## 2021-10-07 PROCEDURE — 93010 ELECTROCARDIOGRAM REPORT: CPT | Performed by: INTERNAL MEDICINE

## 2021-10-07 PROCEDURE — 83036 HEMOGLOBIN GLYCOSYLATED A1C: CPT

## 2021-10-07 PROCEDURE — 82378 CARCINOEMBRYONIC ANTIGEN: CPT

## 2021-10-07 RX ORDER — NAPROXEN SODIUM 220 MG
220 TABLET ORAL EVERY 12 HOURS PRN
COMMUNITY

## 2021-10-07 RX ORDER — SODIUM PICOSULFATE, MAGNESIUM OXIDE, AND ANHYDROUS CITRIC ACID 10; 3.5; 12 MG/160ML; G/160ML; G/160ML
LIQUID ORAL
Status: ON HOLD | COMMUNITY
Start: 2021-10-05 | End: 2021-10-12

## 2021-10-07 NOTE — PAT
An arrival time for procedure was not given during PAT visit. If patient had any questions or concerns about their arrival time, they were instructed to contact their surgeon/physician.  Additionally, if the patient referred to an arrival time that was acquired from their my chart account, patient was encouraged to verify that time with their surgeon/physician.  NO arrival times given in Pre Admission Testing Department.    Patient to apply Chlorhexadine wipes  to surgical area (as instructed) the night before procedure and the AM of procedure. Wipes provided.    Patient instructed to drink 20 ounces (or until full) of Gatorade and it needs to be completed 1 hour (for Main OR patients) or 2 hours (scheduled  section patients) before given arrival time for procedure (NO RED Gatorade)    Patient verbalized understanding.    Patient did not review general PAT education video as instructed in their preoperative information received from their surgeon.  One-on-one Pre Admission Testing general education provided during PAT visit.  Copies of PAT general education handouts (Incentive Spirometry, Meds to Beds Program, Patient Belongings, Pre-op skin preparation instructions, Blood Glucose testing, Visitor policy, Surgery FAQ, Code H) distributed to patient. Encouraged patient/family to read PAT general education handouts thoroughly and notify PAT staff with any questions or concerns. Patient instructed to bring PAT pass and completed skin prep sheet (if applicable) on the day of procedure. Patient verbalized understanding of all information and priority content.     Patient is aware of COVID testing appt at [facility] on 10/10/21.

## 2021-10-10 ENCOUNTER — APPOINTMENT (OUTPATIENT)
Dept: PREADMISSION TESTING | Facility: HOSPITAL | Age: 66
End: 2021-10-10

## 2021-10-10 LAB — SARS-COV-2 RNA PNL SPEC NAA+PROBE: NOT DETECTED

## 2021-10-10 PROCEDURE — C9803 HOPD COVID-19 SPEC COLLECT: HCPCS

## 2021-10-10 PROCEDURE — U0004 COV-19 TEST NON-CDC HGH THRU: HCPCS

## 2021-10-11 ENCOUNTER — ANESTHESIA EVENT (OUTPATIENT)
Dept: PERIOP | Facility: HOSPITAL | Age: 66
End: 2021-10-11

## 2021-10-11 RX ORDER — FAMOTIDINE 10 MG/ML
20 INJECTION, SOLUTION INTRAVENOUS ONCE
Status: CANCELLED | OUTPATIENT
Start: 2021-10-11 | End: 2021-10-11

## 2021-10-11 RX ORDER — SODIUM CHLORIDE 0.9 % (FLUSH) 0.9 %
10 SYRINGE (ML) INJECTION AS NEEDED
Status: CANCELLED | OUTPATIENT
Start: 2021-10-11

## 2021-10-11 RX ORDER — SODIUM CHLORIDE 0.9 % (FLUSH) 0.9 %
10 SYRINGE (ML) INJECTION EVERY 12 HOURS SCHEDULED
Status: CANCELLED | OUTPATIENT
Start: 2021-10-11

## 2021-10-12 ENCOUNTER — HOSPITAL ENCOUNTER (INPATIENT)
Facility: HOSPITAL | Age: 66
LOS: 2 days | Discharge: HOME | End: 2021-10-14
Attending: SURGERY | Admitting: SURGERY

## 2021-10-12 ENCOUNTER — ANESTHESIA (OUTPATIENT)
Dept: PERIOP | Facility: HOSPITAL | Age: 66
End: 2021-10-12

## 2021-10-12 ENCOUNTER — ANESTHESIA EVENT CONVERTED (OUTPATIENT)
Dept: ANESTHESIOLOGY | Facility: HOSPITAL | Age: 66
End: 2021-10-12

## 2021-10-12 DIAGNOSIS — D12.2 ADENOMATOUS POLYP OF ASCENDING COLON: Primary | ICD-10-CM

## 2021-10-12 DIAGNOSIS — K63.5 COLON POLYP: ICD-10-CM

## 2021-10-12 PROCEDURE — C1889 IMPLANT/INSERT DEVICE, NOC: HCPCS | Performed by: SURGERY

## 2021-10-12 PROCEDURE — 25010000002 ONDANSETRON PER 1 MG: Performed by: ANESTHESIOLOGY

## 2021-10-12 PROCEDURE — 25010000002 HYDRALAZINE PER 20 MG

## 2021-10-12 PROCEDURE — 25010000003 CEFAZOLIN IN DEXTROSE 2-4 GM/100ML-% SOLUTION: Performed by: SURGERY

## 2021-10-12 PROCEDURE — 25010000002 HYDROMORPHONE HCL-NACL 30-0.9 MG/30ML-% SOLUTION PREFILLED SYRINGE

## 2021-10-12 PROCEDURE — 25010000002 METOCLOPRAMIDE PER 10 MG: Performed by: SURGERY

## 2021-10-12 PROCEDURE — 0DTF4ZZ RESECTION OF RIGHT LARGE INTESTINE, PERCUTANEOUS ENDOSCOPIC APPROACH: ICD-10-PCS | Performed by: SURGERY

## 2021-10-12 PROCEDURE — 88309 TISSUE EXAM BY PATHOLOGIST: CPT | Performed by: SURGERY

## 2021-10-12 PROCEDURE — 25010000002 NEOSTIGMINE 10 MG/10ML SOLUTION: Performed by: ANESTHESIOLOGY

## 2021-10-12 DEVICE — PROXIMATE LINEAR CUTTER RELOAD, BLUE, 75MM
Type: IMPLANTABLE DEVICE | Site: ABDOMEN | Status: FUNCTIONAL
Brand: PROXIMATE

## 2021-10-12 DEVICE — ENDOPATH ECHELON ENDOSCOPIC LINEAR CUTTER RELOADS, WHITE, 60MM
Type: IMPLANTABLE DEVICE | Site: ABDOMEN | Status: FUNCTIONAL
Brand: ECHELON ENDOPATH

## 2021-10-12 DEVICE — PROXIMATE RELOADABLE LINEAR STAPLER
Type: IMPLANTABLE DEVICE | Site: ABDOMEN | Status: FUNCTIONAL
Brand: PROXIMATE

## 2021-10-12 DEVICE — PROXIMATE RELOADABLE LINEAR CUTTER WITH SAFETY LOCK-OUT, 75MM
Type: IMPLANTABLE DEVICE | Site: ABDOMEN | Status: FUNCTIONAL
Brand: PROXIMATE

## 2021-10-12 RX ORDER — SIMETHICONE 80 MG
80 TABLET,CHEWABLE ORAL 4 TIMES DAILY PRN
Status: DISCONTINUED | OUTPATIENT
Start: 2021-10-12 | End: 2021-10-14 | Stop reason: HOSPADM

## 2021-10-12 RX ORDER — GLYCOPYRROLATE 0.2 MG/ML
INJECTION INTRAMUSCULAR; INTRAVENOUS AS NEEDED
Status: DISCONTINUED | OUTPATIENT
Start: 2021-10-12 | End: 2021-10-12 | Stop reason: SURG

## 2021-10-12 RX ORDER — ALVIMOPAN 12 MG/1
12 CAPSULE ORAL ONCE
Status: COMPLETED | OUTPATIENT
Start: 2021-10-12 | End: 2021-10-12

## 2021-10-12 RX ORDER — GABAPENTIN 300 MG/1
300 CAPSULE ORAL 3 TIMES DAILY
Status: DISCONTINUED | OUTPATIENT
Start: 2021-10-12 | End: 2021-10-14 | Stop reason: HOSPADM

## 2021-10-12 RX ORDER — FENTANYL CITRATE 50 UG/ML
50 INJECTION, SOLUTION INTRAMUSCULAR; INTRAVENOUS
Status: DISCONTINUED | OUTPATIENT
Start: 2021-10-12 | End: 2021-10-12 | Stop reason: HOSPADM

## 2021-10-12 RX ORDER — PROMETHAZINE HYDROCHLORIDE 12.5 MG/1
12.5 SUPPOSITORY RECTAL
Status: DISCONTINUED | OUTPATIENT
Start: 2021-10-12 | End: 2021-10-12 | Stop reason: SDUPTHER

## 2021-10-12 RX ORDER — BISACODYL 5 MG/1
10 TABLET, DELAYED RELEASE ORAL DAILY
Status: DISCONTINUED | OUTPATIENT
Start: 2021-10-12 | End: 2021-10-14 | Stop reason: HOSPADM

## 2021-10-12 RX ORDER — HYDROMORPHONE HYDROCHLORIDE 1 MG/ML
0.5 INJECTION, SOLUTION INTRAMUSCULAR; INTRAVENOUS; SUBCUTANEOUS
Status: DISCONTINUED | OUTPATIENT
Start: 2021-10-12 | End: 2021-10-12 | Stop reason: HOSPADM

## 2021-10-12 RX ORDER — MAGNESIUM HYDROXIDE 1200 MG/15ML
LIQUID ORAL AS NEEDED
Status: DISCONTINUED | OUTPATIENT
Start: 2021-10-12 | End: 2021-10-12 | Stop reason: HOSPADM

## 2021-10-12 RX ORDER — ONDANSETRON 2 MG/ML
4 INJECTION INTRAMUSCULAR; INTRAVENOUS EVERY 6 HOURS PRN
Status: DISCONTINUED | OUTPATIENT
Start: 2021-10-12 | End: 2021-10-12 | Stop reason: SDUPTHER

## 2021-10-12 RX ORDER — FAMOTIDINE 20 MG/1
20 TABLET, FILM COATED ORAL ONCE
Status: COMPLETED | OUTPATIENT
Start: 2021-10-12 | End: 2021-10-12

## 2021-10-12 RX ORDER — PREGABALIN 75 MG/1
75 CAPSULE ORAL ONCE
Status: COMPLETED | OUTPATIENT
Start: 2021-10-12 | End: 2021-10-12

## 2021-10-12 RX ORDER — MELOXICAM 15 MG/1
15 TABLET ORAL ONCE
Status: COMPLETED | OUTPATIENT
Start: 2021-10-12 | End: 2021-10-12

## 2021-10-12 RX ORDER — HEPARIN SODIUM 5000 [USP'U]/ML
5000 INJECTION, SOLUTION INTRAVENOUS; SUBCUTANEOUS EVERY 8 HOURS SCHEDULED
Status: DISCONTINUED | OUTPATIENT
Start: 2021-10-13 | End: 2021-10-14 | Stop reason: HOSPADM

## 2021-10-12 RX ORDER — PROMETHAZINE HYDROCHLORIDE 25 MG/1
12.5 TABLET ORAL
Status: DISCONTINUED | OUTPATIENT
Start: 2021-10-12 | End: 2021-10-12 | Stop reason: SDUPTHER

## 2021-10-12 RX ORDER — ENALAPRILAT 2.5 MG/2ML
1.25 INJECTION INTRAVENOUS EVERY 6 HOURS PRN
Status: DISCONTINUED | OUTPATIENT
Start: 2021-10-12 | End: 2021-10-14 | Stop reason: HOSPADM

## 2021-10-12 RX ORDER — METOCLOPRAMIDE HYDROCHLORIDE 5 MG/ML
10 INJECTION INTRAMUSCULAR; INTRAVENOUS EVERY 6 HOURS SCHEDULED
Status: DISCONTINUED | OUTPATIENT
Start: 2021-10-12 | End: 2021-10-14 | Stop reason: HOSPADM

## 2021-10-12 RX ORDER — ACETAMINOPHEN 500 MG
1000 TABLET ORAL ONCE
Status: COMPLETED | OUTPATIENT
Start: 2021-10-12 | End: 2021-10-12

## 2021-10-12 RX ORDER — TAMSULOSIN HYDROCHLORIDE 0.4 MG/1
0.4 CAPSULE ORAL DAILY
Status: DISCONTINUED | OUTPATIENT
Start: 2021-10-12 | End: 2021-10-14 | Stop reason: HOSPADM

## 2021-10-12 RX ORDER — SODIUM CHLORIDE, SODIUM LACTATE, POTASSIUM CHLORIDE, CALCIUM CHLORIDE 600; 310; 30; 20 MG/100ML; MG/100ML; MG/100ML; MG/100ML
INJECTION, SOLUTION INTRAVENOUS CONTINUOUS PRN
Status: DISCONTINUED | OUTPATIENT
Start: 2021-10-12 | End: 2021-10-12 | Stop reason: SURG

## 2021-10-12 RX ORDER — SCOLOPAMINE TRANSDERMAL SYSTEM 1 MG/1
1 PATCH, EXTENDED RELEASE TRANSDERMAL CONTINUOUS
Status: DISCONTINUED | OUTPATIENT
Start: 2021-10-12 | End: 2021-10-14 | Stop reason: HOSPADM

## 2021-10-12 RX ORDER — OXYCODONE HYDROCHLORIDE 5 MG/1
5 TABLET ORAL EVERY 4 HOURS PRN
Status: DISCONTINUED | OUTPATIENT
Start: 2021-10-12 | End: 2021-10-14 | Stop reason: HOSPADM

## 2021-10-12 RX ORDER — MIDAZOLAM HYDROCHLORIDE 1 MG/ML
1 INJECTION INTRAMUSCULAR; INTRAVENOUS
Status: DISCONTINUED | OUTPATIENT
Start: 2021-10-12 | End: 2021-10-12 | Stop reason: HOSPADM

## 2021-10-12 RX ORDER — OXYCODONE HYDROCHLORIDE AND ACETAMINOPHEN 5; 325 MG/1; MG/1
2 TABLET ORAL EVERY 4 HOURS PRN
Status: DISCONTINUED | OUTPATIENT
Start: 2021-10-12 | End: 2021-10-14 | Stop reason: HOSPADM

## 2021-10-12 RX ORDER — METRONIDAZOLE 500 MG/1
500 TABLET ORAL EVERY 8 HOURS
Status: COMPLETED | OUTPATIENT
Start: 2021-10-12 | End: 2021-10-13

## 2021-10-12 RX ORDER — PROMETHAZINE HYDROCHLORIDE 12.5 MG/1
12.5 TABLET ORAL EVERY 6 HOURS PRN
Status: DISCONTINUED | OUTPATIENT
Start: 2021-10-12 | End: 2021-10-14 | Stop reason: HOSPADM

## 2021-10-12 RX ORDER — CEFAZOLIN SODIUM 2 G/100ML
2 INJECTION, SOLUTION INTRAVENOUS ONCE
Status: COMPLETED | OUTPATIENT
Start: 2021-10-12 | End: 2021-10-12

## 2021-10-12 RX ORDER — DOCUSATE SODIUM 100 MG/1
100 CAPSULE, LIQUID FILLED ORAL 2 TIMES DAILY
Status: DISCONTINUED | OUTPATIENT
Start: 2021-10-12 | End: 2021-10-14 | Stop reason: HOSPADM

## 2021-10-12 RX ORDER — ONDANSETRON 2 MG/ML
4 INJECTION INTRAMUSCULAR; INTRAVENOUS EVERY 6 HOURS PRN
Status: DISCONTINUED | OUTPATIENT
Start: 2021-10-12 | End: 2021-10-14 | Stop reason: HOSPADM

## 2021-10-12 RX ORDER — LIDOCAINE HYDROCHLORIDE 10 MG/ML
0.5 INJECTION, SOLUTION EPIDURAL; INFILTRATION; INTRACAUDAL; PERINEURAL ONCE AS NEEDED
Status: COMPLETED | OUTPATIENT
Start: 2021-10-12 | End: 2021-10-12

## 2021-10-12 RX ORDER — DIPHENHYDRAMINE HYDROCHLORIDE 50 MG/ML
25 INJECTION INTRAMUSCULAR; INTRAVENOUS EVERY 6 HOURS PRN
Status: DISCONTINUED | OUTPATIENT
Start: 2021-10-12 | End: 2021-10-14 | Stop reason: HOSPADM

## 2021-10-12 RX ORDER — ONDANSETRON 2 MG/ML
INJECTION INTRAMUSCULAR; INTRAVENOUS AS NEEDED
Status: DISCONTINUED | OUTPATIENT
Start: 2021-10-12 | End: 2021-10-12 | Stop reason: SURG

## 2021-10-12 RX ORDER — SODIUM CHLORIDE, SODIUM LACTATE, POTASSIUM CHLORIDE, CALCIUM CHLORIDE 600; 310; 30; 20 MG/100ML; MG/100ML; MG/100ML; MG/100ML
150 INJECTION, SOLUTION INTRAVENOUS CONTINUOUS
Status: DISCONTINUED | OUTPATIENT
Start: 2021-10-12 | End: 2021-10-13

## 2021-10-12 RX ORDER — NEOSTIGMINE METHYLSULFATE 1 MG/ML
INJECTION, SOLUTION INTRAVENOUS AS NEEDED
Status: DISCONTINUED | OUTPATIENT
Start: 2021-10-12 | End: 2021-10-12 | Stop reason: SURG

## 2021-10-12 RX ORDER — SODIUM CHLORIDE, SODIUM LACTATE, POTASSIUM CHLORIDE, CALCIUM CHLORIDE 600; 310; 30; 20 MG/100ML; MG/100ML; MG/100ML; MG/100ML
9 INJECTION, SOLUTION INTRAVENOUS CONTINUOUS
Status: DISCONTINUED | OUTPATIENT
Start: 2021-10-12 | End: 2021-10-12

## 2021-10-12 RX ORDER — HYDRALAZINE HYDROCHLORIDE 20 MG/ML
INJECTION INTRAMUSCULAR; INTRAVENOUS
Status: COMPLETED
Start: 2021-10-12 | End: 2021-10-12

## 2021-10-12 RX ORDER — NALOXONE HCL 0.4 MG/ML
0.1 VIAL (ML) INJECTION
Status: DISCONTINUED | OUTPATIENT
Start: 2021-10-12 | End: 2021-10-14 | Stop reason: HOSPADM

## 2021-10-12 RX ORDER — METOCLOPRAMIDE HYDROCHLORIDE 5 MG/ML
10 INJECTION INTRAMUSCULAR; INTRAVENOUS EVERY 6 HOURS
Status: DISCONTINUED | OUTPATIENT
Start: 2021-10-12 | End: 2021-10-12 | Stop reason: SDUPTHER

## 2021-10-12 RX ORDER — PROMETHAZINE HYDROCHLORIDE 12.5 MG/1
12.5 SUPPOSITORY RECTAL EVERY 6 HOURS PRN
Status: DISCONTINUED | OUTPATIENT
Start: 2021-10-12 | End: 2021-10-14 | Stop reason: HOSPADM

## 2021-10-12 RX ORDER — ALVIMOPAN 12 MG/1
12 CAPSULE ORAL 2 TIMES DAILY
Status: DISCONTINUED | OUTPATIENT
Start: 2021-10-13 | End: 2021-10-14 | Stop reason: HOSPADM

## 2021-10-12 RX ORDER — ONDANSETRON 4 MG/1
4 TABLET, FILM COATED ORAL EVERY 6 HOURS PRN
Status: DISCONTINUED | OUTPATIENT
Start: 2021-10-12 | End: 2021-10-14 | Stop reason: HOSPADM

## 2021-10-12 RX ORDER — CEFAZOLIN SODIUM 2 G/100ML
2 INJECTION, SOLUTION INTRAVENOUS EVERY 8 HOURS
Status: COMPLETED | OUTPATIENT
Start: 2021-10-12 | End: 2021-10-13

## 2021-10-12 RX ADMIN — MELOXICAM 15 MG: 15 TABLET ORAL at 06:44

## 2021-10-12 RX ADMIN — ONDANSETRON 4 MG: 2 INJECTION INTRAMUSCULAR; INTRAVENOUS at 10:16

## 2021-10-12 RX ADMIN — METRONIDAZOLE 500 MG: 500 TABLET ORAL at 15:06

## 2021-10-12 RX ADMIN — Medication: at 11:00

## 2021-10-12 RX ADMIN — SCOPALAMINE 1 PATCH: 1 PATCH, EXTENDED RELEASE TRANSDERMAL at 06:44

## 2021-10-12 RX ADMIN — ALVIMOPAN 12 MG: 12 CAPSULE ORAL at 06:44

## 2021-10-12 RX ADMIN — NEOSTIGMINE METHYLSULFATE 3 MG: 0.5 INJECTION INTRAVENOUS at 10:16

## 2021-10-12 RX ADMIN — HYDRALAZINE HYDROCHLORIDE 5 MG: 20 INJECTION INTRAMUSCULAR; INTRAVENOUS at 11:45

## 2021-10-12 RX ADMIN — ACETAMINOPHEN 1000 MG: 500 TABLET ORAL at 06:44

## 2021-10-12 RX ADMIN — PREGABALIN 75 MG: 75 CAPSULE ORAL at 06:44

## 2021-10-12 RX ADMIN — METRONIDAZOLE 500 MG: 500 TABLET ORAL at 23:08

## 2021-10-12 RX ADMIN — SODIUM CHLORIDE, POTASSIUM CHLORIDE, SODIUM LACTATE AND CALCIUM CHLORIDE: 600; 310; 30; 20 INJECTION, SOLUTION INTRAVENOUS at 07:55

## 2021-10-12 RX ADMIN — DOCUSATE SODIUM 100 MG: 100 CAPSULE, LIQUID FILLED ORAL at 21:12

## 2021-10-12 RX ADMIN — GABAPENTIN 300 MG: 300 CAPSULE ORAL at 15:06

## 2021-10-12 RX ADMIN — SODIUM CHLORIDE, POTASSIUM CHLORIDE, SODIUM LACTATE AND CALCIUM CHLORIDE 9 ML/HR: 600; 310; 30; 20 INJECTION, SOLUTION INTRAVENOUS at 06:30

## 2021-10-12 RX ADMIN — CEFAZOLIN SODIUM 2 G: 2 INJECTION, SOLUTION INTRAVENOUS at 18:16

## 2021-10-12 RX ADMIN — GLYCOPYRROLATE 0.4 MG: 0.2 INJECTION INTRAMUSCULAR; INTRAVENOUS at 10:16

## 2021-10-12 RX ADMIN — SODIUM CHLORIDE, POTASSIUM CHLORIDE, SODIUM LACTATE AND CALCIUM CHLORIDE 150 ML/HR: 600; 310; 30; 20 INJECTION, SOLUTION INTRAVENOUS at 22:14

## 2021-10-12 RX ADMIN — FAMOTIDINE 20 MG: 20 TABLET ORAL at 06:45

## 2021-10-12 RX ADMIN — CEFAZOLIN SODIUM 2 G: 2 INJECTION, SOLUTION INTRAVENOUS at 08:05

## 2021-10-12 RX ADMIN — CEFAZOLIN SODIUM 2 G: 2 INJECTION, SOLUTION INTRAVENOUS at 23:08

## 2021-10-12 RX ADMIN — SODIUM CHLORIDE, POTASSIUM CHLORIDE, SODIUM LACTATE AND CALCIUM CHLORIDE 150 ML/HR: 600; 310; 30; 20 INJECTION, SOLUTION INTRAVENOUS at 11:00

## 2021-10-12 RX ADMIN — TAMSULOSIN HYDROCHLORIDE 0.4 MG: 0.4 CAPSULE ORAL at 15:06

## 2021-10-12 RX ADMIN — LIDOCAINE HYDROCHLORIDE 0.2 ML: 10 INJECTION, SOLUTION EPIDURAL; INFILTRATION; INTRACAUDAL; PERINEURAL at 06:30

## 2021-10-12 RX ADMIN — METOCLOPRAMIDE 10 MG: 5 INJECTION, SOLUTION INTRAMUSCULAR; INTRAVENOUS at 23:07

## 2021-10-12 RX ADMIN — METRONIDAZOLE 500 MG: 500 INJECTION, SOLUTION INTRAVENOUS at 08:16

## 2021-10-12 RX ADMIN — BISACODYL 10 MG: 5 TABLET, COATED ORAL at 15:06

## 2021-10-12 RX ADMIN — GABAPENTIN 300 MG: 300 CAPSULE ORAL at 21:12

## 2021-10-12 NOTE — CASE MANAGEMENT/SOCIAL WORK
Discharge Planning Assessment  [facility]     Patient Name: Angelo Martin  MRN: [id number]  Today's Date: 10/12/2021    Admit Date: 10/12/2021     Discharge Needs Assessment     Row Name 10/12/21 1409       Living Environment    Lives With spouse    Current Living Arrangements home/apartment/condo    Primary Care Provided by self    Provides Primary Care For no one    Family Caregiver if Needed spouse    Able to Return to Prior Arrangements yes       Resource/Environmental Concerns    Resource/Environmental Concerns none       Transition Planning    Patient/Family Anticipates Transition to home with family    Patient/Family Anticipated Services at Transition none    Transportation Anticipated family or friend will provide       Discharge Needs Assessment    Readmission Within the Last 30 Days no previous admission in last 30 days    Equipment Currently Used at Home none    Concerns to be Addressed denies needs/concerns at this time; no discharge needs identified    Anticipated Changes Related to Illness none    Equipment Needed After Discharge none               Discharge Plan     Row Name 10/12/21 6703       Plan    Plan Home    Patient/Family in Agreement with Plan yes    Plan Comments Met & spoke wtih Mr Martin and his spouse at the bedside. He lives with his spouse in [facility]. At baseline, is ind with ADL's/mobility. Denies DME/HH/Rehab/Oxygen. No POA/living will. No DC needs voiced. Plan is home and spouse will transport.    Final Discharge Disposition Code 01 - home or self-care              Continued Care and Services - Admitted Since 10/12/2021    Coordination has not been started for this encounter.          Demographic Summary     Row Name 10/12/21 3207       General Information    Admission Type inpatient    General Information Comments Verified PCP is Dr Héctor Oilvas. Primary insurance is [employer]. Has drug coverage.               Functional Status     Row Name 10/12/21 0025        Functional Status    Usual Activity Tolerance good    Current Activity Tolerance good       Functional Status, IADL    Medications independent    Meal Preparation independent    Housekeeping independent    Laundry independent    Shopping independent               Psychosocial    No documentation.                Abuse/Neglect    No documentation.                Legal    No documentation.                Substance Abuse    No documentation.                Patient Forms    No documentation.                   Bozena Farias RN

## 2021-10-12 NOTE — OP NOTE
OPERATIVE NOTE    Patient Name:  Angelo Martin  YOB: 1955  [id number]    Date of Surgery:  10/12/2021      PREOPERATIVE DIAGNOSIS: Unresectable right colon polyp      POSTOPERATIVE DIAGNOSIS: Same        PROCEDURE PERFORMED:   1. Laparoscopic right hemicolectomy       SURGEON: Reuben Macias MD       Circulator: Anita Holden RN; Milligan, Bryanna, RN; Hugo Subramanian RN  Scrub Person: Luis Diamond  Nursing Assistant: Abigail Olivas PCT              SPECIMENS: Terminal ileum, cecum, appendix, and right colon       ANESTHESIA: General.        FINDINGS:   1.  Right colectomy resected without difficulty with primary anastomosis between terminal ileum and the transverse colon.       INDICATIONS: The patient is a 65 y.o. male who presented with an unresectable colon polyp in the ascending colon.  He has known history of prostate cancer as well.  The risks and benefits of a laparoscopic right colectomy were discussed at length with the patient and his family, and they agreed to proceed.       DESCRIPTION OF PROCEDURE:       After obtaining informed consent, the patient was taken to the operating room and placed in supine  position. After appropriate DVT and antibiotic prophylaxis, general anesthesia was induced. TAP blocks were placed by the anesthesia staff bilaterally to aid in post-op pain control . The abdomen  was prepped and draped in standard sterile fashion, and a vertical midline incision was made just above the umbilicus.  Blunt dissection was carried to the base of the umbilicus which was grasped with a Kocher clamp and elevated anteriorly.  A vertical midline incision through the fascia was made.  Blunt dissection was carried into the abdominal cavity.  A stay suture of 0 Vicryl was then placed in figure-of-eight fashion around the defect and a blunt trocar advanced without difficulty into the abdominal cavity.  The abdomen was insufflated with carbon dioxide gas to a  pressure of 15 mmHg.  The laparoscope was then advanced through the trocar and the abdominal contents inspected.  There was no evidence of bowel, bladder, or visceral injury with entrance of the trocar.  At this point a standard laparoscopic right hemicolectomy port placement schema was followed with care taken to avoid the bladder inferiorly.     The terminal ileum and cecum were elevated anteriorly.  The retroperitoneum was scored just inferior to the ileocolic vessels and blunt dissection was carried into the retroperitoneum.  The duodenal sweep was identified and spared.  This dissection plane was carried cephalad above the transverse colon, and laterally to the white line of Toldt.  The cecum was then mobilized medially, and its attachments to the white line of Toldt laterally were divided using electrocautery and LigaSure device.  The right ureter was identified and spared through this dissection.  The dissection was carried cephalad around the hepatic flexure.    With elevation of the ileocolic vessels, they were then ligated using a 2-0 silk suture tied laparoscopically.  It was then divided using a stapling device with care taken not to encroach upon the duodenum.  Additional dissection of the right colon mesentery was taken using the LigaSure.  The right colic vessels  were identified and divided using a stapling device as well.    The transverse colon was pulled inferiorly, and the retroperitoneum scored just above the transverse colon.  The previous dissection plane was entered easily and this dissection plane carried proximally around and included the hepatic flexure.  The duodenal sweep was completely visualized and spared.  Additional dissection was used to mobilize the proximal transverse colon and elevate the vasculature as well.  With additional inspection, the entire right colon was mobilized adequately for exteriorization.    The supraumbilical incision was extended cephalad to a total length of  8 cm .  Electrocautery dissection was carried down to the level of the anterior fascia which was elevated anteriorly and sharply divided.  A wound protector was placed within the wound.  The terminal ileum, cecum, appendix, right colon, and proximal transverse colons were all delivered through the wound protector onto the abdominal wall.  An appropriate proximal resection site on the terminal ileum was decided upon and transected using a stapler device.  In similar fashion, an appropriate distal resection site on the transverse colon was decided upon and transected.  The remaining mesentery to the resected specimen was taken between clamps, the contents of the clamps tied, and they were divided.  Specimen was then passed off.    A stapled side to side, functional end-to-end anastomosis between the terminal ileum and the transverse colon was then performed.  At the completion anastomosis it was widely patent, and the bowel limbs were without torsion or tension.  All staple lines were oversewn with silk sutures in Lembert fashion.   The anastomosis was then returned to the abdominal cavity, and the wound protector was removed.  The operating team then changed their gowns and gloves.    The extraction site fascia was then closed using interrupted 0 PDS sutures.  Pneumoperitoneum was reestablished.  The anastomosis was inspected laparoscopically, and was sitting in the right upper quadrant without torsion or tension.  It was covered with a flap of omentum.  The proximal and distal bowel were without injury.  The abdomen was then irrigated and suctioned clear.   All trocars were then removed under direct and laparoscopic visualization and the abdomen was desufflated.    The wounds were then irrigated with antibiotic saline and closed in each area using running subcuticular sutures.  The incisions were dressed in standard sterile fashion, and covered with dry sterile dressings.     All sponge and needle counts were  correct times two at the completion of the procedure . The patient recovered from anesthesia, was extubated  in the operating room  and was transported to the PACU  in stable condition.          Reuben Macias MD  10/12/2021  10:24 EDT

## 2021-10-12 NOTE — ANESTHESIA POSTPROCEDURE EVALUATION
Patient: Angelo Martin    Procedure Summary     Date: 10/12/21 Room / Location:  [facility]    Anesthesia Start: 0755 Anesthesia Stop: 1039    Procedure: LAPAROSCOPIC RIGHT COLECTOMY (Right Abdomen) Diagnosis:     Surgeons: Reuben Macias MD Provider: Jd Dueñas MD    Anesthesia Type: general with block ASA Status: 2          Anesthesia Type: general with block    Vitals  Vitals Value Taken Time   /63 10/12/21 1036   Temp     Pulse 67 10/12/21 1039   Resp     SpO2 96 % 10/12/21 1039   Vitals shown include unvalidated device data.        Post Anesthesia Care and Evaluation    Patient location during evaluation: PACU  Patient participation: complete - patient participated  Level of consciousness: awake and alert  Pain management: adequate  Airway patency: patent  Anesthetic complications: No anesthetic complications  PONV Status: none  Cardiovascular status: hemodynamically stable and acceptable  Respiratory status: nonlabored ventilation, acceptable and nasal cannula  Hydration status: acceptable

## 2021-10-12 NOTE — PROGRESS NOTES
"Patient Name:  Angelo Martin  YOB: 1955  [id number]    Surgery Post - Operative Note    Date of visit: 10/12/2021    Subjective   Subjective: Feels OK, a little sore.       Objective     Objective:    /79 (BP Location: Right arm, Patient Position: Lying)   Pulse 88   Temp 97.3 °F (36.3 °C) (Axillary)   Resp 16   Ht 177.8 cm (70\")   Wt 92.5 kg (204 lb)   SpO2 91%   BMI 29.27 kg/m²     CV:  Rate  regular and rhythm  regular  L:  Clear  to auscultation bilaterally   ABD:  Soft, appropriately tender. Dressings  clean, dry and intact   EXT:  No cyanosis, clubbing or edema         Assessment/Plan     Assessment/ Plan: Doing well after Lap R colectomy. Continue Pulmonary toilet    Problem List Items Addressed This Visit        Gastrointestinal Abdominal     Colon polyp    Relevant Orders    Tissue Pathology Exam           Active Hospital Problems    Diagnosis  POA   • **Adenomatous polyp of ascending colon [D12.2]  Yes   • Colon polyp [K63.5]  Yes   • Prostate cancer (HCC) [C61]  Yes   • Mixed hyperlipidemia [E78.2]  Yes   • Tobacco abuse [Z72.0]  Yes      Resolved Hospital Problems   No resolved problems to display.            Reuben Macias MD  10/12/2021  17:22 EDT    "

## 2021-10-12 NOTE — ANESTHESIA PREPROCEDURE EVALUATION
Anesthesia Evaluation     Patient summary reviewed and Nursing notes reviewed   NPO Solid Status: > 8 hours  NPO Liquid Status: > 2 hours           Airway   Mallampati: III  TM distance: >3 FB  Neck ROM: limited  Possible difficult intubation  Dental      Pulmonary    (+) a smoker Current, asthma,  Cardiovascular     ECG reviewed    (+) hyperlipidemia,   (-) hypertension, past MI, dysrhythmias, angina    ROS comment: ECG NSR     Neuro/Psych  GI/Hepatic/Renal/Endo    (-) liver disease, no renal disease, diabetes, no thyroid disorder    Musculoskeletal     Abdominal    Substance History      OB/GYN          Other      history of cancer (prostate melanoma )                    Anesthesia Plan    ASA 2     general with block   (TAPs   Propofol infusion as part of an anti PONV technique  VLS standby)  intravenous induction     Anesthetic plan, all risks, benefits, and alternatives have been provided, discussed and informed consent has been obtained with: patient.    Plan discussed with CRNA.

## 2021-10-12 NOTE — BRIEF OP NOTE
COLON RESECTION RIGHT OR TRANSVERSE LAPAROSCOPIC  Progress Note    Angelo Martin  10/12/2021    Pre-op Diagnosis:   Unresectable right colon polyp       Post-Op Diagnosis Codes:  Same    Procedure/CPT® Codes:        Procedure(s):  LAPAROSCOPIC RIGHT COLECTOMY    Surgeon(s):  Reuben Macias MD Salazar, Paul A, MD    Anesthesia: General    Staff:   Circulator: Anita Holden RN; Milligan, Bryanna, SHAUN; Hugo Subramanian RN  Scrub Person: Luis Diamond  Nursing Assistant: Abigail Olivas PCT         Estimated Blood Loss: minimal    Urine Voided: * No values recorded between 10/12/2021  7:54 AM and 10/12/2021 10:23 AM *    Specimens:                Specimens     ID Source Type Tests Collected By Collected At Frozen?    B Small Intestine Tissue · TISSUE PATHOLOGY EXAM   Reuben Macias MD 10/12/21 0931     Description: TERMINAL ILEUM, CECUM, APPENDIX, RIGHT COLON    This specimen was not marked as sent.                Drains:   Urethral Catheter Silicone 16 Fr. (Active)       Findings: Laparoscopic right colectomy with primary anastomosis between the terminal ileum and the transverse colon.    Complications: None          Reuben Macias MD     Date: 10/12/2021  Time: 10:23 EDT

## 2021-10-12 NOTE — PLAN OF CARE
Goal Outcome Evaluation:   VSS. Patient arrived from PACU this afternoon. Lap sites are C/D/I. NG tube connected to low wall suction. PCA pump continued. Complains of minimal pain. Osullivan in place. Ambulated in room. Wife at bedside this afternoon. Incentive spirometer and chewing gum at bedside. Will continue to monitor.        Progress: no change

## 2021-10-12 NOTE — ANESTHESIA PROCEDURE NOTES
Airway  Urgency: elective    Date/Time: 10/12/2021 8:02 AM  Airway not difficult    General Information and Staff    Patient location during procedure: OR  Anesthesiologist: Efra Guajardo MD    Indications and Patient Condition  Indications for airway management: airway protection    Preoxygenated: yes  MILS not maintained throughout  Mask difficulty assessment: 1 - vent by mask    Final Airway Details  Final airway type: endotracheal airway      Successful airway: ETT  Cuffed: yes   Successful intubation technique: direct laryngoscopy  Endotracheal tube insertion site: oral  Blade: Clayton  Blade size: 3  ETT size (mm): 7.5  Cormack-Lehane Classification: grade I - full view of glottis  Placement verified by: chest auscultation and capnometry   Measured from: lips  ETT/EBT  to lips (cm): 20  Number of attempts at approach: 1  Assessment: lips, teeth, and gum same as pre-op and atraumatic intubation    Additional Comments  Negative epigastric sounds, Breath sound equal bilaterally with symmetric chest rise and fall

## 2021-10-12 NOTE — H&P
"[facility] Pre-op    Full history and physical note from office is up to date. Mr. Martin is scheduled for a laparoscopic right colectomy. 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     /80 (BP Location: Right arm, Patient Position: Sitting)   Pulse 64   Temp 97.2 °F (36.2 °C) (Temporal)   Resp 18   Ht 177.8 cm (70\")   Wt 92.5 kg (204 lb)   SpO2 98%   BMI 29.27 kg/m²      PE-  Heart- regular rate and rhythm, no murmurs, gallops or rubs  Lungs- CTA  Abdomen- soft and non-tender    ROS-  Negative for fever, chills, SOA, nausea, or vomiting    IMM:  Influenza:  None in the past two years  Pneumococcal:  None  Tetanus:  UTD    LAB Results:  Lab Results   Component Value Date    WBC 9.55 10/07/2021    HGB 16.4 10/07/2021    HCT 47.3 10/07/2021    MCV 93.5 10/07/2021     10/07/2021    NEUTROABS 5.78 03/10/2017    GLUCOSE 105 (H) 10/07/2021    BUN 15 10/07/2021    CREATININE 0.79 10/07/2021    EGFRIFNONA 99 10/07/2021    EGFRIFAFRI 120 03/10/2017     10/07/2021    K 4.8 10/07/2021     10/07/2021    CO2 25.0 10/07/2021    CALCIUM 9.4 10/07/2021    ALBUMIN 4.50 03/10/2017    AST 24 03/10/2017    ALT 30 03/10/2017    BILITOT 0.3 03/10/2017       Cancer Staging (if applicable)  Cancer Patient: __ yes __no _x_unknown__N/A; If yes, clinical stage T:__ N:__M:__, stage group or __N/A    Impression: TV adenoma  Plan: right colectomy    Erasto Pathak PA-C 10/12/2021 06:48 EDT  "

## 2021-10-13 LAB
ANION GAP SERPL CALCULATED.3IONS-SCNC: 9 MMOL/L (ref 5–15)
BASOPHILS # BLD AUTO: 0.05 10*3/MM3 (ref 0–0.2)
BASOPHILS NFR BLD AUTO: 0.3 % (ref 0–1.5)
BUN SERPL-MCNC: 12 MG/DL (ref 8–23)
BUN/CREAT SERPL: 14.5 (ref 7–25)
CALCIUM SPEC-SCNC: 8.9 MG/DL (ref 8.6–10.5)
CHLORIDE SERPL-SCNC: 104 MMOL/L (ref 98–107)
CO2 SERPL-SCNC: 28 MMOL/L (ref 22–29)
CREAT SERPL-MCNC: 0.83 MG/DL (ref 0.76–1.27)
DEPRECATED RDW RBC AUTO: 48.8 FL (ref 37–54)
EOSINOPHIL # BLD AUTO: 0.02 10*3/MM3 (ref 0–0.4)
EOSINOPHIL NFR BLD AUTO: 0.1 % (ref 0.3–6.2)
ERYTHROCYTE [DISTWIDTH] IN BLOOD BY AUTOMATED COUNT: 13.4 % (ref 12.3–15.4)
GFR SERPL CREATININE-BSD FRML MDRD: 93 ML/MIN/1.73
GLUCOSE SERPL-MCNC: 140 MG/DL (ref 65–99)
HCT VFR BLD AUTO: 45.1 % (ref 37.5–51)
HGB BLD-MCNC: 14.4 G/DL (ref 13–17.7)
IMM GRANULOCYTES # BLD AUTO: 0.06 10*3/MM3 (ref 0–0.05)
IMM GRANULOCYTES NFR BLD AUTO: 0.4 % (ref 0–0.5)
LYMPHOCYTES # BLD AUTO: 2.64 10*3/MM3 (ref 0.7–3.1)
LYMPHOCYTES NFR BLD AUTO: 17.9 % (ref 19.6–45.3)
MCH RBC QN AUTO: 31.4 PG (ref 26.6–33)
MCHC RBC AUTO-ENTMCNC: 31.9 G/DL (ref 31.5–35.7)
MCV RBC AUTO: 98.3 FL (ref 79–97)
MONOCYTES # BLD AUTO: 1.29 10*3/MM3 (ref 0.1–0.9)
MONOCYTES NFR BLD AUTO: 8.8 % (ref 5–12)
NEUTROPHILS NFR BLD AUTO: 10.67 10*3/MM3 (ref 1.7–7)
NEUTROPHILS NFR BLD AUTO: 72.5 % (ref 42.7–76)
NRBC BLD AUTO-RTO: 0 /100 WBC (ref 0–0.2)
PLATELET # BLD AUTO: 242 10*3/MM3 (ref 140–450)
PMV BLD AUTO: 9.6 FL (ref 6–12)
POTASSIUM SERPL-SCNC: 4.4 MMOL/L (ref 3.5–5.2)
RBC # BLD AUTO: 4.59 10*6/MM3 (ref 4.14–5.8)
SODIUM SERPL-SCNC: 141 MMOL/L (ref 136–145)
WBC # BLD AUTO: 14.73 10*3/MM3 (ref 3.4–10.8)

## 2021-10-13 PROCEDURE — 80048 BASIC METABOLIC PNL TOTAL CA: CPT | Performed by: SURGERY

## 2021-10-13 PROCEDURE — 25010000003 CEFAZOLIN IN DEXTROSE 2-4 GM/100ML-% SOLUTION: Performed by: SURGERY

## 2021-10-13 PROCEDURE — 25010000002 HEPARIN (PORCINE) PER 1000 UNITS: Performed by: SURGERY

## 2021-10-13 PROCEDURE — 63710000001 ONDANSETRON PER 8 MG: Performed by: SURGERY

## 2021-10-13 PROCEDURE — 25010000002 METOCLOPRAMIDE PER 10 MG: Performed by: SURGERY

## 2021-10-13 PROCEDURE — 25010000002 METHYLNALTREXONE 12 MG/0.6ML SOLUTION: Performed by: SURGERY

## 2021-10-13 PROCEDURE — 85025 COMPLETE CBC W/AUTO DIFF WBC: CPT | Performed by: SURGERY

## 2021-10-13 RX ORDER — DEXTROSE, SODIUM CHLORIDE, AND POTASSIUM CHLORIDE 5; .45; .15 G/100ML; G/100ML; G/100ML
125 INJECTION INTRAVENOUS CONTINUOUS
Status: DISCONTINUED | OUTPATIENT
Start: 2021-10-13 | End: 2021-10-14

## 2021-10-13 RX ADMIN — BISACODYL 10 MG: 5 TABLET, COATED ORAL at 10:24

## 2021-10-13 RX ADMIN — DOCUSATE SODIUM 100 MG: 100 CAPSULE, LIQUID FILLED ORAL at 20:13

## 2021-10-13 RX ADMIN — SODIUM CHLORIDE, POTASSIUM CHLORIDE, SODIUM LACTATE AND CALCIUM CHLORIDE 150 ML/HR: 600; 310; 30; 20 INJECTION, SOLUTION INTRAVENOUS at 05:05

## 2021-10-13 RX ADMIN — GABAPENTIN 300 MG: 300 CAPSULE ORAL at 10:24

## 2021-10-13 RX ADMIN — METHYLNALTREXONE BROMIDE 4 MG: 12 INJECTION, SOLUTION SUBCUTANEOUS at 10:23

## 2021-10-13 RX ADMIN — TAMSULOSIN HYDROCHLORIDE 0.4 MG: 0.4 CAPSULE ORAL at 10:30

## 2021-10-13 RX ADMIN — ONDANSETRON HYDROCHLORIDE 4 MG: 4 TABLET, FILM COATED ORAL at 20:14

## 2021-10-13 RX ADMIN — HEPARIN SODIUM 5000 UNITS: 5000 INJECTION, SOLUTION INTRAVENOUS; SUBCUTANEOUS at 05:33

## 2021-10-13 RX ADMIN — GABAPENTIN 300 MG: 300 CAPSULE ORAL at 20:13

## 2021-10-13 RX ADMIN — HEPARIN SODIUM 5000 UNITS: 5000 INJECTION, SOLUTION INTRAVENOUS; SUBCUTANEOUS at 21:40

## 2021-10-13 RX ADMIN — DOCUSATE SODIUM 100 MG: 100 CAPSULE, LIQUID FILLED ORAL at 10:24

## 2021-10-13 RX ADMIN — POTASSIUM CHLORIDE, DEXTROSE MONOHYDRATE AND SODIUM CHLORIDE 125 ML/HR: 150; 5; 450 INJECTION, SOLUTION INTRAVENOUS at 10:39

## 2021-10-13 RX ADMIN — METOCLOPRAMIDE 10 MG: 5 INJECTION, SOLUTION INTRAMUSCULAR; INTRAVENOUS at 05:33

## 2021-10-13 RX ADMIN — OXYCODONE 5 MG: 5 TABLET ORAL at 20:14

## 2021-10-13 RX ADMIN — GABAPENTIN 300 MG: 300 CAPSULE ORAL at 18:00

## 2021-10-13 RX ADMIN — METOCLOPRAMIDE 10 MG: 5 INJECTION, SOLUTION INTRAMUSCULAR; INTRAVENOUS at 12:45

## 2021-10-13 RX ADMIN — ALVIMOPAN 12 MG: 12 CAPSULE ORAL at 10:24

## 2021-10-13 RX ADMIN — CEFAZOLIN SODIUM 2 G: 2 INJECTION, SOLUTION INTRAVENOUS at 10:24

## 2021-10-13 RX ADMIN — ALVIMOPAN 12 MG: 12 CAPSULE ORAL at 20:13

## 2021-10-13 RX ADMIN — METOCLOPRAMIDE 10 MG: 5 INJECTION, SOLUTION INTRAMUSCULAR; INTRAVENOUS at 18:00

## 2021-10-13 RX ADMIN — METRONIDAZOLE 500 MG: 500 TABLET ORAL at 10:30

## 2021-10-13 RX ADMIN — HEPARIN SODIUM 5000 UNITS: 5000 INJECTION, SOLUTION INTRAVENOUS; SUBCUTANEOUS at 13:26

## 2021-10-13 NOTE — PLAN OF CARE
Goal Outcome Evaluation:  Plan of Care Reviewed With: patient        Progress: improving  Outcome Summary: VSS.  65 year old white male admitted on 10/12/2021 S/P surgery.  He has an NG in place in his left Nares to LWS.  He has a FC to SD in place and patent.  He has an IV in place and patent infusing without difficulty.  Patient has a PCA in place that he self administeres.  He has surgical sites x 4 covered with telfa and tegaderm.  Mr. Martin has been up x 2 to ambulate in the hallway.  Will continue to monitor patient throughout the rest of this shift.

## 2021-10-13 NOTE — PROGRESS NOTES
"Patient Name:  Angelo Martin  YOB: 1955  [id number]    Surgery Progress Note    Date of visit: 10/13/2021    Subjective   Subjective: Feels OK, pain controlled.         Objective     Objective:     /71 (BP Location: Right arm, Patient Position: Lying)   Pulse 73   Temp 98.2 °F (36.8 °C) (Oral)   Resp 16   Ht 177.8 cm (70\")   Wt 92.5 kg (204 lb)   SpO2 94%   BMI 29.27 kg/m²     Intake/Output Summary (Last 24 hours) at 10/13/2021 0738  Last data filed at 10/13/2021 0600  Gross per 24 hour   Intake 1203.1 ml   Output 2100 ml   Net -896.9 ml       CV:  Rhythm  regular and rate regular   L:  Clear  to auscultation bilaterally   Abd:  Bowel sounds hypoactive, soft, minimally tender  Ext:  No cyanosis, clubbing, edema    Recent labs that are back at this time have been reviewed.        Assessment/Plan     Assessment/ Plan:    Problem List Items Addressed This Visit        Gastrointestinal Abdominal     Colon polyp- Doing well after R colectomy. D/C NG and Osullivan. Clears PO. Increase mobility, await bowel function.    Relevant Orders    Tissue Pathology Exam           Active Hospital Problems    Diagnosis  POA   • **Adenomatous polyp of ascending colon [D12.2]  Yes   • Colon polyp [K63.5]  Yes   • Prostate cancer (HCC) [C61]  Yes   • Mixed hyperlipidemia [E78.2]  Yes   • Tobacco abuse [Z72.0]  Yes      Resolved Hospital Problems   No resolved problems to display.              Reuben Macias MD  10/13/2021  07:38 EDT      "

## 2021-10-14 ENCOUNTER — READMISSION MANAGEMENT (OUTPATIENT)
Dept: CALL CENTER | Facility: HOSPITAL | Age: 66
End: 2021-10-14

## 2021-10-14 VITALS
HEART RATE: 69 BPM | TEMPERATURE: 98.1 F | WEIGHT: 204 LBS | SYSTOLIC BLOOD PRESSURE: 177 MMHG | HEIGHT: 70 IN | RESPIRATION RATE: 18 BRPM | OXYGEN SATURATION: 93 % | BODY MASS INDEX: 29.2 KG/M2 | DIASTOLIC BLOOD PRESSURE: 76 MMHG

## 2021-10-14 LAB
ANION GAP SERPL CALCULATED.3IONS-SCNC: 5 MMOL/L (ref 5–15)
BUN SERPL-MCNC: 11 MG/DL (ref 8–23)
BUN/CREAT SERPL: 13.3 (ref 7–25)
CALCIUM SPEC-SCNC: 8.7 MG/DL (ref 8.6–10.5)
CHLORIDE SERPL-SCNC: 104 MMOL/L (ref 98–107)
CO2 SERPL-SCNC: 30 MMOL/L (ref 22–29)
CREAT SERPL-MCNC: 0.83 MG/DL (ref 0.76–1.27)
DEPRECATED RDW RBC AUTO: 48.1 FL (ref 37–54)
ERYTHROCYTE [DISTWIDTH] IN BLOOD BY AUTOMATED COUNT: 13.5 % (ref 12.3–15.4)
GFR SERPL CREATININE-BSD FRML MDRD: 93 ML/MIN/1.73
GLUCOSE SERPL-MCNC: 177 MG/DL (ref 65–99)
HCT VFR BLD AUTO: 43.9 % (ref 37.5–51)
HGB BLD-MCNC: 14.2 G/DL (ref 13–17.7)
MCH RBC QN AUTO: 31.4 PG (ref 26.6–33)
MCHC RBC AUTO-ENTMCNC: 32.3 G/DL (ref 31.5–35.7)
MCV RBC AUTO: 97.1 FL (ref 79–97)
PLATELET # BLD AUTO: 247 10*3/MM3 (ref 140–450)
PMV BLD AUTO: 9.7 FL (ref 6–12)
POTASSIUM SERPL-SCNC: 5 MMOL/L (ref 3.5–5.2)
RBC # BLD AUTO: 4.52 10*6/MM3 (ref 4.14–5.8)
SODIUM SERPL-SCNC: 139 MMOL/L (ref 136–145)
WBC # BLD AUTO: 13.56 10*3/MM3 (ref 3.4–10.8)

## 2021-10-14 PROCEDURE — 80048 BASIC METABOLIC PNL TOTAL CA: CPT | Performed by: SURGERY

## 2021-10-14 PROCEDURE — 25010000002 METOCLOPRAMIDE PER 10 MG: Performed by: SURGERY

## 2021-10-14 PROCEDURE — 85027 COMPLETE CBC AUTOMATED: CPT | Performed by: SURGERY

## 2021-10-14 PROCEDURE — 25010000002 HEPARIN (PORCINE) PER 1000 UNITS: Performed by: SURGERY

## 2021-10-14 RX ORDER — PSEUDOEPHEDRINE HCL 30 MG
100 TABLET ORAL 2 TIMES DAILY
Qty: 20 CAPSULE | Refills: 0 | Status: SHIPPED | OUTPATIENT
Start: 2021-10-14

## 2021-10-14 RX ORDER — OXYCODONE HYDROCHLORIDE 5 MG/1
5 TABLET ORAL EVERY 4 HOURS PRN
Qty: 17 TABLET | Refills: 0 | Status: SHIPPED | OUTPATIENT
Start: 2021-10-14 | End: 2021-10-22

## 2021-10-14 RX ADMIN — OXYCODONE 5 MG: 5 TABLET ORAL at 12:04

## 2021-10-14 RX ADMIN — GABAPENTIN 300 MG: 300 CAPSULE ORAL at 09:36

## 2021-10-14 RX ADMIN — TAMSULOSIN HYDROCHLORIDE 0.4 MG: 0.4 CAPSULE ORAL at 09:35

## 2021-10-14 RX ADMIN — BISACODYL 10 MG: 5 TABLET, COATED ORAL at 09:35

## 2021-10-14 RX ADMIN — GABAPENTIN 300 MG: 300 CAPSULE ORAL at 16:43

## 2021-10-14 RX ADMIN — ALVIMOPAN 12 MG: 12 CAPSULE ORAL at 09:35

## 2021-10-14 RX ADMIN — METOCLOPRAMIDE 10 MG: 5 INJECTION, SOLUTION INTRAMUSCULAR; INTRAVENOUS at 05:19

## 2021-10-14 RX ADMIN — HEPARIN SODIUM 5000 UNITS: 5000 INJECTION, SOLUTION INTRAVENOUS; SUBCUTANEOUS at 05:19

## 2021-10-14 RX ADMIN — METOCLOPRAMIDE 10 MG: 5 INJECTION, SOLUTION INTRAMUSCULAR; INTRAVENOUS at 12:04

## 2021-10-14 RX ADMIN — METOCLOPRAMIDE 10 MG: 5 INJECTION, SOLUTION INTRAMUSCULAR; INTRAVENOUS at 00:16

## 2021-10-14 RX ADMIN — DOCUSATE SODIUM 100 MG: 100 CAPSULE, LIQUID FILLED ORAL at 09:36

## 2021-10-14 RX ADMIN — HEPARIN SODIUM 5000 UNITS: 5000 INJECTION, SOLUTION INTRAVENOUS; SUBCUTANEOUS at 13:54

## 2021-10-14 RX ADMIN — OXYCODONE 5 MG: 5 TABLET ORAL at 05:25

## 2021-10-14 NOTE — PROGRESS NOTES
"Patient Name:  Angelo Martin  YOB: 1955  [id number]    Surgery Progress Note    Date of visit: 10/14/2021    Subjective   Subjective: NAEON, VSS, afebrile. Pain controlled with PRNs. BM x1         Objective     Objective    /73   Pulse 66   Temp 97.7 °F (36.5 °C) (Oral)   Resp 18   Ht 177.8 cm (70\")   Wt 92.5 kg (204 lb)   SpO2 91%   BMI 29.27 kg/m²     Intake/Output Summary (Last 24 hours) at 10/14/2021 0707  Last data filed at 10/14/2021 0500  Gross per 24 hour   Intake 1.6 ml   Output 2650 ml   Net -2648.4 ml       CV:  Rhythm regular and rate regular   L:  Clear  to auscultation bilaterally   Abd:  Bowel sounds positive, soft, non distended, dressings c/d/i  Ext:  No cyanosis, clubbing, edema    Labs that are back at this time have been reviewed.        Assessment/Plan     Assessment/ Plan:    Problem List Items Addressed This Visit        Gastrointestinal Abdominal     Colon polyp  Patient is s/o right colectomy, recovering as expected. Has had return of bowel function, will advance diet today. Discussed importance of ambulation and pulmonary hygiene. Continue with PRNs for pain control.     Relevant Orders    Tissue Pathology Exam           Active Hospital Problems    Diagnosis  POA   • **Adenomatous polyp of ascending colon [D12.2]  Yes   • Colon polyp [K63.5]  Yes   • Prostate cancer (HCC) [C61]  Yes   • Mixed hyperlipidemia [E78.2]  Yes   • Tobacco abuse [Z72.0]  Yes      Resolved Hospital Problems   No resolved problems to display.            Olman Weaver MD  10/14/2021  07:07 EDT      I have personally performed the key portions of the history and physical exam, and I have edited the above note to better reflect my complete history and physical exam.    Feeling better, tolerating PO, had a BM.    CV:  Rhythm  regular and rate regular   L:  Clear  to auscultation bilaterally   Abd:  Bowel sounds positive , soft, appropriately tender  Ext:  No cyanosis, clubbing, " edema    Labs: Labs reviewed       Assessment/ Plan:    Doing well after Lap R colectomy. HLIV. Hopefully home later today.      Problem List Items Addressed This Visit        Gastrointestinal Abdominal     Colon polyp    Relevant Orders    Tissue Pathology Exam              Reuben Macias MD  07:10 EDT

## 2021-10-14 NOTE — PLAN OF CARE
Goal Outcome Evaluation:Discharged to home care, after printed instructions reviewed.

## 2021-10-14 NOTE — PAYOR COMM NOTE
"Angelo Martin (65 y.o. Male)             Date of Birth Social Security Number Address Home Phone MRN    1955  [id number] [phone] [id number]    Catholic Marital Status             None        Admission Date Admission Type Admitting Provider Attending Provider Department, Room/Bed    10/12/21 Elective Reuben Macias MD Shane, Matthew D., MD [facility] 5B, N530/1    Discharge Date Discharge Disposition Discharge Destination                         Attending Provider: Reuben Macias MD    Allergies: No Known Allergies    Isolation: None   Infection: None   Code Status: CPR   Advance Care Planning Activity    Ht: 177.8 cm (70\")   Wt: 92.5 kg (204 lb)    Admission Cmt: None   Principal Problem: Adenomatous polyp of ascending colon [D12.2]                 Active Insurance as of 10/12/2021     Primary Coverage     Payor Plan Insurance Group Employer/Plan Group    [id number] PPO [id number]     Payor Plan Address Payor Plan Phone Number Payor Plan Fax Number Effective Dates    [address] [phone]  11/1/2020 - None Entered    [place] [address]       Subscriber Name Subscriber Birth Date Member ID       TIO MARTIN 1955 [id number]                 Emergency Contacts      (Rel.) Home Phone Work Phone Mobile Phone    Tio Martin (Spouse) [phone] -- --              Lab Results (last 24 hours)     Procedure Component Value Units Date/Time    Basic Metabolic Panel [[id number]]  (Abnormal) Collected: 10/14/21 0509    Specimen: Blood Updated: 10/14/21 0546     Glucose 177 mg/dL      BUN 11 mg/dL      Creatinine 0.83 mg/dL      Sodium 139 mmol/L      Potassium 5.0 mmol/L      Chloride 104 mmol/L      CO2 30.0 mmol/L      Calcium 8.7 mg/dL      eGFR Non African Amer 93 mL/min/1.73      BUN/Creatinine Ratio 13.3     Anion Gap 5.0 mmol/L     Narrative:      GFR Normal >60  Chronic Kidney Disease " <60  Kidney Failure <15      CBC (No Diff) [[id number]]  (Abnormal) Collected: 10/14/21 0509    Specimen: Blood Updated: 10/14/21 0528     WBC 13.56 10*3/mm3      RBC 4.52 10*6/mm3      Hemoglobin 14.2 g/dL      Hematocrit 43.9 %      MCV 97.1 fL      MCH 31.4 pg      MCHC 32.3 g/dL      RDW 13.5 %      RDW-SD 48.1 fl      MPV 9.7 fL      Platelets 247 10*3/mm3     Basic Metabolic Panel [[id number]]  (Abnormal) Collected: 10/13/21 1241    Specimen: Blood Updated: 10/13/21 1332     Glucose 140 mg/dL      BUN 12 mg/dL      Creatinine 0.83 mg/dL      Sodium 141 mmol/L      Potassium 4.4 mmol/L      Comment: Slight hemolysis detected by analyzer. Results may be affected.        Chloride 104 mmol/L      CO2 28.0 mmol/L      Calcium 8.9 mg/dL      eGFR Non African Amer 93 mL/min/1.73      BUN/Creatinine Ratio 14.5     Anion Gap 9.0 mmol/L     Narrative:      GFR Normal >60  Chronic Kidney Disease <60  Kidney Failure <15      CBC & Differential [[id number]]  (Abnormal) Collected: 10/13/21 1241    Specimen: Blood Updated: 10/13/21 1304    Narrative:      The following orders were created for panel order CBC & Differential.  Procedure                               Abnormality         Status                     ---------                               -----------         ------                     CBC Auto Differential[[id number]]        Abnormal            Final result                 Please view results for these tests on the individual orders.    CBC Auto Differential [[id number]]  (Abnormal) Collected: 10/13/21 1241    Specimen: Blood Updated: 10/13/21 1304     WBC 14.73 10*3/mm3      RBC 4.59 10*6/mm3      Hemoglobin 14.4 g/dL      Hematocrit 45.1 %      MCV 98.3 fL      MCH 31.4 pg      MCHC 31.9 g/dL      RDW 13.4 %      RDW-SD 48.8 fl      MPV 9.6 fL      Platelets 242 10*3/mm3      Neutrophil % 72.5 %      Lymphocyte % 17.9 %      Monocyte % 8.8 %      Eosinophil % 0.1 %      Basophil % 0.3 %      Immature Grans % 0.4 %   "    Neutrophils, Absolute 10.67 10*3/mm3      Lymphocytes, Absolute 2.64 10*3/mm3      Monocytes, Absolute 1.29 10*3/mm3      Eosinophils, Absolute 0.02 10*3/mm3      Basophils, Absolute 0.05 10*3/mm3      Immature Grans, Absolute 0.06 10*3/mm3      nRBC 0.0 /100 WBC         Imaging Results (Last 24 Hours)     ** No results found for the last 24 hours. **           Physician Progress Notes (last 24 hours)      Reuben Macias MD at 10/14/21 0707          Patient Name:  Angelo Martin  YOB: 1955  [id number]    Surgery Progress Note    Date of visit: 10/14/2021    Subjective   Subjective: NAEON, VSS, afebrile. Pain controlled with PRNs. BM x1        Objective     Objective    /73   Pulse 66   Temp 97.7 °F (36.5 °C) (Oral)   Resp 18   Ht 177.8 cm (70\")   Wt 92.5 kg (204 lb)   SpO2 91%   BMI 29.27 kg/m²     Intake/Output Summary (Last 24 hours) at 10/14/2021 0707  Last data filed at 10/14/2021 0500  Gross per 24 hour   Intake 1.6 ml   Output 2650 ml   Net -2648.4 ml       CV:  Rhythm regular and rate regular   L:  Clear  to auscultation bilaterally   Abd:  Bowel sounds positive, soft, non distended, dressings c/d/i  Ext:  No cyanosis, clubbing, edema    Labs that are back at this time have been reviewed.       Assessment/Plan     Assessment/ Plan:    Problem List Items Addressed This Visit        Gastrointestinal Abdominal     Colon polyp  Patient is s/o right colectomy, recovering as expected. Has had return of bowel function, will advance diet today. Discussed importance of ambulation and pulmonary hygiene. Continue with PRNs for pain control.     Relevant Orders    Tissue Pathology Exam           Active Hospital Problems    Diagnosis  POA   • **Adenomatous polyp of ascending colon [D12.2]  Yes   • Colon polyp [K63.5]  Yes   • Prostate cancer (HCC) [C61]  Yes   • Mixed hyperlipidemia [E78.2]  Yes   • Tobacco abuse [Z72.0]  Yes      Resolved Hospital Problems   No resolved problems " to display.            Olman Waever MD  10/14/2021  07:07 EDT      I have personally performed the key portions of the history and physical exam, and I have edited the above note to better reflect my complete history and physical exam.    Feeling better, tolerating PO, had a BM.    CV:  Rhythm  regular and rate regular   L:  Clear  to auscultation bilaterally   Abd:  Bowel sounds positive , soft, appropriately tender  Ext:  No cyanosis, clubbing, edema    Labs: Labs reviewed       Assessment/ Plan:    Doing well after Lap R colectomy. HLIV. Hopefully home later today.      Problem List Items Addressed This Visit        Gastrointestinal Abdominal     Colon polyp    Relevant Orders    Tissue Pathology Exam              Reuben Macias MD  07:10 EDT      Electronically signed by Reuben Macias MD at 10/14/21 0711       Consult Notes (last 24 hours)  Notes from 10/13/21 0831 through 10/14/21 0831   No notes of this type exist for this encounter.

## 2021-10-14 NOTE — DISCHARGE SUMMARY
Discharge Summary    Patient Name:  Angelo Martin  YOB: 1955  [id number]      DATE OF ADMISSION: 10/12/2021    DATE OF DISCHARGE: 10/14/2021       FINAL DIAGNOSES:   Patient Active Problem List   Diagnosis   • Tobacco abuse   • Benign non-nodular prostatic hyperplasia   • Mixed hyperlipidemia   • Reduced libido   • Fatigue   • Hematuria   • Hypogonadism in male   • Prostate cancer (HCC)   • Adenomatous polyp of ascending colon   • Colon polyp       CONSULTING SERVICES: None      PROCEDURES PERFORMED:   1.  Laparoscopic right hemicolectomy performed on 10/12/2021    HISTORY: The patient is a very pleasant 65 y.o. male with a history of an unresectable colon polyp in the ascending colon.  The risks and benefits of laparoscopic right hemicolectomy were discussed at length with the patient and his family, and they agreed to proceed.     HOSPITAL COURSE: The patient came in as an outpatient, underwent his operative procedure, was transferred to the floor.  Postoperatively he did well his.  His pain was initially controlled on IV PCA and transition to oral pain medication.  He had gradual return of bowel and bladder function.  He continued to improve, and was ready for discharge home on 10/14/2021.     CONDITION: At the time of discharge, the patient is tolerating a regular diet without difficulty. he is having normal bowel and bladder function. his incisions are clean, dry and intact.     DISCHARGE MEDICATIONS:   1. All previous home medications.   2. Percocet  5 - 10 mg PO  Q4h  PRN pain  3. Colace 100mg PO BID       DISCHARGE INSTRUCTIONS:  1. The patient is instructed to follow up with Dr. Macias in 2 weeks’ time.  2. he is instructed to refrain from lifting more than 15 or 20 pounds until their return to clinic.   3. If the patient has worsening problems with fever or chills nausea or vomiting he is to contact Dr. Macias's office and a contact card has been provided.        Reuben Macias,  MD  10/14/2021  16:50 EDT

## 2021-10-14 NOTE — PLAN OF CARE
Goal Outcome Evaluation:  Plan of Care Reviewed With: patient        Progress: improving  Outcome Summary: VSS.  65 year old white male admitted on 10/12/2021 S/P surgery.   He has an IV in place and patent infusing without difficulty.   He has surgical sites x 4 covered with telfa and tegaderm.  Patient has been up ad moises.  He has had a BM without difficulty and + Flatus.  He has tolerated regular diet.  Mr. Martin has been up x 2 to ambulate in the hallway.  Will continue to monitor patient throughout the rest of this shift.

## 2021-10-15 ENCOUNTER — TRANSITIONAL CARE MANAGEMENT TELEPHONE ENCOUNTER (OUTPATIENT)
Dept: CALL CENTER | Facility: HOSPITAL | Age: 66
End: 2021-10-15

## 2021-10-15 LAB
CYTO UR: NORMAL
LAB AP CASE REPORT: NORMAL
LAB AP CLINICAL INFORMATION: NORMAL
PATH REPORT.FINAL DX SPEC: NORMAL
PATH REPORT.GROSS SPEC: NORMAL

## 2021-10-15 NOTE — OUTREACH NOTE
Prep Survey      Responses   [facility] facility patient discharged from? [place]   Is LACE score < 7 ? No   Emergency Room discharge w/ pulse ox? No   Eligibility [facility]   Date of Admission 10/12/21   Date of Discharge 10/14/21   Discharge Disposition Home or Self Care   Discharge diagnosis Adenomatous polyp of colon-lap right hemicolectomy   Does the patient have one of the following disease processes/diagnoses(primary or secondary)? General Surgery   Does the patient have Home health ordered? No   Is there a DME ordered? No   Prep survey completed? Yes          Gissel Rosales RN

## 2021-10-15 NOTE — OUTREACH NOTE
Call Center TCM Note      Responses   [facility] patient discharged from? [place]   Does the patient have one of the following disease processes/diagnoses(primary or secondary)? General Surgery   TCM attempt successful? Yes   Call start time 1246   Call end time 1253   Discharge diagnosis Adenomatous polyp of colon-lap right hemicolectomy   Meds reviewed with patient/caregiver? Yes   Is the patient having any side effects they believe may be caused by any medication additions or changes? No   Does the patient have all medications related to this admission filled (includes all antibiotics, pain medications, etc.) Yes   Is the patient taking all medications as directed (includes completed medication regime)? Yes   Does the patient have a follow up appointment scheduled with their surgeon? Yes  [10/29/21]   Has the patient kept scheduled appointments due by today? N/A   Comments HOSP DC FU with PCP 10/22/21 @ 12pm.    Has home health visited the patient within 72 hours of discharge? N/A   Psychosocial issues? No   Did the patient receive a copy of their discharge instructions? Yes   Nursing interventions Reviewed instructions with patient   What is the patient's perception of their health status since discharge? Improving   Nursing interventions Nurse provided patient education   Is the patient /caregiver able to teach back basic post-op care? Practice 'cough and deep breath',  No tub bath, swimming, or hot tub until instructed by MD,  Take showers only when approved by MD-sponge bathe until then,  Keep incision areas clean,dry and protected,  Lifting as instructed by MD in discharge instructions,  Drive as instructed by MD in discharge instructions   Is the patient/caregiver able to teach back signs and symptoms of incisional infection? Fever,  Pus or odor from incision,  Incisional warmth,  Increased drainage or bleeding,  Increased redness, swelling or pain at the incisonal site   Is the patient/caregiver  able to teach back steps to recovery at home? Rest and rebuild strength, gradually increase activity   If the patient is a current smoker, are they able to teach back resources for cessation? --  [Has not smoked since hospital]   Is the patient/caregiver able to teach back the hierarchy of who to call/visit for symptoms/problems? PCP, Specialist, Home health nurse, Urgent Care, ED, 911 Yes   Additional teach back comments Wife is a nurse   TCM call completed? Yes   Wrap up additional comments Pt doing well. having BM normally. No issues to report. WIfe will remove dressing today.Wife is also a nurse.           Gricelda Noble RN    10/15/2021, 12:54 EDT

## 2021-10-26 ENCOUNTER — HOSPITAL ENCOUNTER (OUTPATIENT)
Dept: RADIATION ONCOLOGY | Facility: HOSPITAL | Age: 66
Discharge: HOME | End: 2021-10-26

## 2021-10-26 ENCOUNTER — OFFICE VISIT (OUTPATIENT)
Dept: RADIATION ONCOLOGY | Facility: HOSPITAL | Age: 66
End: 2021-10-26

## 2021-10-26 ENCOUNTER — READMISSION MANAGEMENT (OUTPATIENT)
Dept: CALL CENTER | Facility: HOSPITAL | Age: 66
End: 2021-10-26

## 2021-10-26 VITALS
OXYGEN SATURATION: 98 % | BODY MASS INDEX: 28.74 KG/M2 | HEART RATE: 65 BPM | DIASTOLIC BLOOD PRESSURE: 80 MMHG | TEMPERATURE: 96.8 F | SYSTOLIC BLOOD PRESSURE: 153 MMHG | WEIGHT: 200.3 LBS | RESPIRATION RATE: 18 BRPM

## 2021-10-26 DIAGNOSIS — C61 PROSTATE CANCER (HCC): ICD-10-CM

## 2021-10-26 PROCEDURE — 77399 UNLISTED PX MED RADJ PHYSICS: CPT | Performed by: RADIOLOGY

## 2021-10-26 PROCEDURE — G0463 HOSPITAL OUTPT CLINIC VISIT: HCPCS | Performed by: RADIOLOGY

## 2021-10-26 NOTE — OUTREACH NOTE
General Surgery Week 2 Survey      Responses   [facility] patient discharged from? [place]   Does the patient have one of the following disease processes/diagnoses(primary or secondary)? General Surgery   Week 2 attempt successful? Yes   Call start time 1601   Call end time 1602   Discharge diagnosis Adenomatous polyp of colon-lap right hemicolectomy   Is the patient taking all medications as directed (includes completed medication regime)? Yes   Does the patient have a follow up appointment scheduled with their surgeon? Yes   Has the patient kept scheduled appointments due by today? Yes   Comments saw Dr. Lorenzo today with rad/onc,  will be seeing Dr. Macias next week   Has home health visited the patient within 72 hours of discharge? N/A   Psychosocial issues? No   What is the patient's perception of their health status since discharge? Improving   Nursing interventions Nurse provided patient education   Is the patient /caregiver able to teach back basic post-op care? Keep incision areas clean,dry and protected,  Lifting as instructed by MD in discharge instructions   Is the patient/caregiver able to teach back signs and symptoms of incisional infection? Fever   Is the patient/caregiver able to teach back the hierarchy of who to call/visit for symptoms/problems? PCP, Specialist, Home health nurse, Urgent Care, ED, 911 Yes   Week 2 call completed? Yes   Wrap up additional comments Says he is doing well, no questions or concerns at this time, saw rad/onc doctor today.          Janet Fuentes RN

## 2021-10-26 NOTE — PROGRESS NOTES
RE-EVALUATION    PATIENT:                                                      Angelo Martin  :                                                          1955  DATE:                          10/26/2021   DIAGNOSIS:     Prostate cancer (HCC)  - Stage I (cT1c, cN0, cM0, PSA: 9.4, Grade Group: 1)     BRIEF HISTORY:  The patient is a very pleasant 66 y.o. male  with a new diagnosis of an early stage, favorable risk prostate cancer.  I initially saw him in early September, we discussed several different treatment options, and he was most interested in pursuing CyberKnife radiosurgery.  As the process of his work-up, he had not yet underwent a colonoscopy which we recommended, and unfortunately he was found to have some polyps consistent with a tubulovillous adenoma with concern for high-grade dysplasia.  He was therefore scheduled to undergo a laparoscopic right hemicolectomy and staging which was performed on 2021.  Fortunately, he was only found to have a tubulovillous adenoma with high-grade dysplasia, but no evidence of invasive malignancy during this timeframe, he has also underwent gold seed fiducial placement, and he returns to clinic today for reevaluation so that we can complete radiation treatment planning with the goals of waiting until he has been cleared from general surgery after his hemicolectomy, to actually begin CyberKnife treatments.  He is very pleased today.  He reports normal bowel and bladder function and he denies any complaints.    No Known Allergies    Review of Systems   All other systems reviewed and are negative.           IPSS Questionnaire (AUA-7):  Over the past month…     1)  Incomplete Emptying  How often have you had a sensation of not emptying your bladder?  2 - Less than half the time   2)  Frequency  How often have you had to urinate less than every two hours? 1 - Less than 1 time in 5   3)  Intermittency  How often have you found you stopped and started  again several times when you urinated?  1 - Less than 1 time in 5   4) Urgency  How often have you found it difficult to postpone urination?  4 - More than half the time   5) Weak Stream  How often have you had a weak urinary stream?  2 - Less than half the time   6) Straining  How often have you had to push or strain to begin urination?  1 - Less than 1 time in 5   7) Nocturia  How many times did you typically get up at night to urinate?  1 - 1 time   Total Score:  12         Quality of life due to urinary symptoms:  If you were to spend the rest of your life with your urinary condition the way it is now, how would you feel about that? 2-Mostly Satisfied   Urine Leakage (Incontinence) 0-No Leakage      Sexual Health Inventory  Current Status     1)  How do you rate your confidence that you could achieve and keep an erection? 1-Very Low   2) When you had erections with sexual stimulation, how often were your erections hard enough for penetration (entering your partner)? 1-Almost never or never   3)  During sexual intercourse, how often were you able to maintain your erection after you had penetrated (entered) into your partner? 1-Almost never or never   4) During sexual intercourse, how difficult was it to maintain your erection to completion of intercourse? 1-Extremely difficult   5) When you attempted sexual intercourse, how often was it satisfactory to you? 2-A few times (much less than half the time)   Total Score: 6         Bowel Health Inventory  Current Status: 0-No problems, no rectal bleeding, no discharge, less then 5 bowel movements a day                     Objective   VITAL SIGNS: There were no vitals filed for this visit.     Karnofsky score: 80       Physical Exam  Vitals and nursing note reviewed.   Constitutional:       General: He is not in acute distress.     Appearance: He is well-developed.   HENT:      Head: Normocephalic and atraumatic.   Eyes:      Conjunctiva/sclera: Conjunctivae normal.       Pupils: Pupils are equal, round, and reactive to light.   Cardiovascular:      Rate and Rhythm: Normal rate and regular rhythm.      Heart sounds: No murmur heard.  No friction rub.   Pulmonary:      Effort: Pulmonary effort is normal.      Breath sounds: Normal breath sounds. No wheezing.   Abdominal:      General: Bowel sounds are normal. There is no distension.      Palpations: Abdomen is soft. There is no mass.      Tenderness: There is no abdominal tenderness.      Comments: Multiple well-healed laparoscopic incision sites as well as a central midline incision that is well-healed.  Normal bowel sounds.  No tenderness.   Musculoskeletal:         General: Normal range of motion.      Cervical back: Normal range of motion and neck supple.   Lymphadenopathy:      Cervical: No cervical adenopathy.   Skin:     General: Skin is warm and dry.   Neurological:      Mental Status: He is alert and oriented to person, place, and time.   Psychiatric:         Behavior: Behavior normal.         Thought Content: Thought content normal.         Judgment: Judgment normal.     PATHOLOGY  TERMINAL ILEUM, CECUM, APPENDIX AND RIGHT COLON, RIGHT HEMICOLECTOMY 10/12/2021:  Tubulovillous adenoma with high-grade dysplasia  Negative for invasive carcinoma  Appendix with no pathologic abnormality  Eighteen lymph nodes with no tumor seen (0/18)  Surgical margins negative for dysplasia         The following portions of the patient's history were reviewed and updated as appropriate: allergies, current medications, past family history, past medical history, past social history, past surgical history and problem list.    Diagnoses and all orders for this visit:    Prostate cancer (HCC)      IMPRESSION:  Mr. Martin is a 66-year-old gentleman with a clinical T1c, Amarilis 3+3 = 6 prostate adenocarcinoma with a pretreatment PSA of 9.4 NG/mL.  We initially delayed treatment so that he could undergo a right hemicolectomy, which he now appears to be  recovering very well and according to schedule.  Further, his final pathology revealed no evidence of invasive malignancy for the colon.  We will therefore shift the focus back to completing treatment for his prostate cancer.  He completed CyberKnife simulation and treatment planning today.  I will be working on his treatment plan with my physicist over the next week.  I anticipate treating his prostate to a dose of 35 Gy in 5 fractions of 7 Gy each.  I reiterated the importance of the low fiber diet, daily bowel prep, and prophylactic treatment with alpha-blockers in terms of managing his acute side effects when we begin Cyberknife Treatments.  I sent a prescription of Flomax to his pharmacy.  He should be ready to begin treatment in the next 1-2 weeks pending insurance authorization, but we will obviously wait until we receive the final approval from Dr. Macias, whom he sees on Thursday of this week for his post-operative visit.    RECOMMENDATIONS:    Will plan to start Cyberknife Radiosurgery in 1-2 weeks pending clearance to begin from Dr. Macias with General Surgery         Jen Nuno RN

## 2021-11-01 ENCOUNTER — HOSPITAL ENCOUNTER (OUTPATIENT)
Dept: RADIATION ONCOLOGY | Facility: HOSPITAL | Age: 66
Setting detail: RADIATION/ONCOLOGY SERIES
Discharge: HOME | End: 2021-11-01

## 2021-11-02 PROCEDURE — 77301 RADIOTHERAPY DOSE PLAN IMRT: CPT | Performed by: RADIOLOGY

## 2021-11-02 PROCEDURE — 77338 DESIGN MLC DEVICE FOR IMRT: CPT | Performed by: RADIOLOGY

## 2021-11-02 PROCEDURE — 77300 RADIATION THERAPY DOSE PLAN: CPT | Performed by: RADIOLOGY

## 2021-11-04 ENCOUNTER — READMISSION MANAGEMENT (OUTPATIENT)
Dept: CALL CENTER | Facility: HOSPITAL | Age: 66
End: 2021-11-04

## 2021-11-04 NOTE — OUTREACH NOTE
General Surgery Week 3 Survey      Responses   [facility] patient discharged from? [place]   Does the patient have one of the following disease processes/diagnoses(primary or secondary)? General Surgery   Week 3 attempt successful? No   Unsuccessful attempts Attempt 1          Janet Fuentes RN

## 2021-11-08 ENCOUNTER — READMISSION MANAGEMENT (OUTPATIENT)
Dept: CALL CENTER | Facility: HOSPITAL | Age: 66
End: 2021-11-08

## 2021-11-08 NOTE — OUTREACH NOTE
General Surgery Week 3 Survey      Responses   [facility] patient discharged from? [place]   Does the patient have one of the following disease processes/diagnoses(primary or secondary)? General Surgery   Week 3 attempt successful? No   Unsuccessful attempts Attempt 2   Rescheduled Revoked   Revoke Decline to participate   Discharge diagnosis Adenomatous polyp of colon-lap right hemicolectomy          Tawana Craft RN

## 2021-11-23 ENCOUNTER — HOSPITAL ENCOUNTER (OUTPATIENT)
Dept: RADIATION ONCOLOGY | Facility: HOSPITAL | Age: 66
Discharge: HOME | End: 2021-11-23

## 2021-11-23 PROCEDURE — 77373 STRTCTC BDY RAD THER TX DLVR: CPT | Performed by: RADIOLOGY

## 2021-11-24 ENCOUNTER — HOSPITAL ENCOUNTER (OUTPATIENT)
Dept: RADIATION ONCOLOGY | Facility: HOSPITAL | Age: 66
Discharge: HOME | End: 2021-11-24

## 2021-11-24 PROCEDURE — 77373 STRTCTC BDY RAD THER TX DLVR: CPT | Performed by: RADIOLOGY

## 2021-11-29 ENCOUNTER — DOCUMENTATION (OUTPATIENT)
Dept: UROLOGY | Facility: HOSPITAL | Age: 66
End: 2021-11-29

## 2021-11-29 ENCOUNTER — HOSPITAL ENCOUNTER (OUTPATIENT)
Dept: RADIATION ONCOLOGY | Facility: HOSPITAL | Age: 66
Discharge: HOME | End: 2021-11-29

## 2021-11-29 PROCEDURE — 77373 STRTCTC BDY RAD THER TX DLVR: CPT | Performed by: RADIOLOGY

## 2021-11-30 ENCOUNTER — HOSPITAL ENCOUNTER (OUTPATIENT)
Dept: RADIATION ONCOLOGY | Facility: HOSPITAL | Age: 66
Discharge: HOME | End: 2021-11-30

## 2021-11-30 PROCEDURE — 77373 STRTCTC BDY RAD THER TX DLVR: CPT | Performed by: RADIOLOGY

## 2021-12-01 ENCOUNTER — HOSPITAL ENCOUNTER (OUTPATIENT)
Dept: RADIATION ONCOLOGY | Facility: HOSPITAL | Age: 66
Setting detail: RADIATION/ONCOLOGY SERIES
Discharge: HOME | End: 2021-12-01

## 2021-12-01 ENCOUNTER — HOSPITAL ENCOUNTER (OUTPATIENT)
Dept: RADIATION ONCOLOGY | Facility: HOSPITAL | Age: 66
Discharge: HOME | End: 2021-12-01

## 2021-12-01 PROCEDURE — 77336 RADIATION PHYSICS CONSULT: CPT | Performed by: RADIOLOGY

## 2021-12-01 PROCEDURE — 77373 STRTCTC BDY RAD THER TX DLVR: CPT | Performed by: RADIOLOGY

## 2021-12-30 ENCOUNTER — OFFICE VISIT (OUTPATIENT)
Dept: RADIATION ONCOLOGY | Facility: HOSPITAL | Age: 66
End: 2021-12-30

## 2021-12-30 DIAGNOSIS — C61 PROSTATE CANCER (HCC): Primary | ICD-10-CM

## 2021-12-30 RX ORDER — TAMSULOSIN HYDROCHLORIDE 0.4 MG/1
1 CAPSULE ORAL EVERY MORNING
Qty: 30 CAPSULE | Refills: 11 | Status: SHIPPED | OUTPATIENT
Start: 2021-12-30

## 2021-12-30 NOTE — PROGRESS NOTES
TELEMEDICINE FOLLOW UP NOTE    PATIENT:                                                      Angelo Martin  MEDICAL RECORD #:                        [id number]  :                                                          1955  COMPLETION DATE:   2021  DIAGNOSIS:     Prostate cancer (HCC)  - Stage I (cT1c, cN0, cM0, PSA: 9.4, Grade Group: 1)    This visit has been rescheduled as a phone visit to comply with patient safety concerns in accordance with CDC recommendations in light of the COVID-19 pandemic.  Total time of discussion was 18 minutes.  Verbal consent given.    COVID-19 RISK SCREEN     1. Has the patient had close contact without PPE with a lab confirmed COVID-19 (+) person or a person under investigation (PUI) for COVID-19 infection?  -- No     2. Has the patient had respiratory symptoms, worsened/new cough and/or SOA, unexplained fever, or sudden loss of smell and/or taste in the past 7 days? --  No    3. Does the patient have baseline higher exposure risk such as working in healthcare field or currently residing in healthcare facility?  --  No       BRIEF HISTORY:  Angelo Martin is a 66 y.o. gentleman presenting via telemedicine for initial follow-up visit of his early stage, favorable risk prostate cancer.  He underwent definitive treatment with CyberKnife SBRT.  He tolerated treatment well.  He did not develop significant fatigue.  He did experience an exacerbation of urinary urgency and frequency beyond his baseline urinary outflow obstructive symptoms, and reports an IPSS score of 15.  He continues longstanding use of Flomax, and has required increased dose to twice daily with notable difficulty emptying bladder and weaker stream when he has inadvertently missed a dose or tried to taper back to once daily.   He denies dysuria or incontinence.  He reports bowel function is regular.  Of note, Mr. Martin's treatment was initially delayed due to undergoing a right hemicolectomy on  10/12/2021 with pathology consistent with tubulovillous adenoma with high-grade dysplasia, and no evidence of invasive malignancy.  He denies any GI concerns.  Overall, he feels well.      IPSS Questionnaire (AUA-7):  Over the past month…    1)  Incomplete Emptying  How often have you had a sensation of not emptying your bladder?  2 - Less than half the time   2)  Frequency  How often have you had to urinate less than every two hours? 4 - More than half the time   3)  Intermittency  How often have you found you stopped and started again several times when you urinated?  1 - Less than 1 time in 5   4) Urgency  How often have you found it difficult to postpone urination?  4 - More than half the time   5) Weak Stream  How often have you had a weak urinary stream?  2 - Less than half the time   6) Straining  How often have you had to push or strain to begin urination?  1 - Less than 1 time in 5   7) Nocturia  How many times did you typically get up at night to urinate?  1 - 1 time   Total Score:  15       Quality of life due to urinary symptoms:  If you were to spend the rest of your life with your urinary condition the way it is now, how would you feel about that? 2-Mostly Satisfied   Urine Leakage (Incontinence) 0-No Leakage     Sexual Health Inventory  Current Status    1)  How do you rate your confidence that you could achieve and keep an erection? 1-Very Low   2) When you had erections with sexual stimulation, how often were your erections hard enough for penetration (entering your partner)? 1-Almost never or never   3)  During sexual intercourse, how often were you able to maintain your erection after you had penetrated (entered) into your partner? 1-Almost never or never   4) During sexual intercourse, how difficult was it to maintain your erection to completion of intercourse? 1-Extremely difficult   5) When you attempted sexual intercourse, how often was it satisfactory to you? 2-A few times (much less than  half the time)   Total Score: 6       Bowel Health Inventory  Current Status: 0-No problems, no rectal bleeding, no discharge, less then 5 bowel movements a day           MEDICATIONS: Medication reconciliation for the patient was reviewed and confirmed in the electronic medical record.    Review of Systems   Genitourinary: Positive for frequency.    All other systems reviewed and are negative.          KPS 80%    Physical Exam  Pulmonary:      Respirations even, unlabored. No audible wheezing or cough.  Neurological:      A+Ox4, conversant, answers questions appropriately.  Psychiatric:     Judgement, affect, and decision-making WNL.    Limited physical exam as visit was conducted remotely via telephone in light of the COVID-19 pandemic.            The following portions of the patient's history were reviewed and updated as appropriate: allergies, current medications, past family history, past medical history, past social history, past surgical history and problem list.          Diagnoses and all orders for this visit:    1. Prostate cancer (HCC) (Primary)       IMPRESSION:  Mr. Martin is a 66 y.o. gentleman with a clinical T1c, Norris 3+3 = 6 prostate adenocarcinoma with a pretreatment PSA of 9.4 NG/mL.   He is now 1 month status post CyberKnife SBRT.  He tolerated treatment well.  He developed the anticipated grade 1 lower urinary tract symptoms in the setting of BPH with longstanding use of Flomax.  We discussed that as acute residual toxicities maxine and with the clinical downsizing of his grossly enlarged prostate, he should hopefully be able to taper Flomax back to once daily, as tolerated.  Mr. Martin and I have reviewed the survivorship care plan in detail.  We discussed diagnosis, follow-up intervals, PSA monitoring, and expectations for response to treatment.  A copy of the care plan has been mailed to the patient.  A copy has also been sent to his PCP.    RECOMMENDATIONS:  Mr. Martin continues urologic  surveillance and serial PSA monitoring under the care of Dr. Bland with appropriate follow up scheduled.    Return in about 6 months (around 6/30/2022) for Office Visit.    Shanna Choi, APRN

## 2022-06-30 ENCOUNTER — HOSPITAL ENCOUNTER (OUTPATIENT)
Dept: RADIATION ONCOLOGY | Facility: HOSPITAL | Age: 67
Setting detail: RADIATION/ONCOLOGY SERIES
Discharge: HOME | End: 2022-06-30

## 2022-06-30 ENCOUNTER — OFFICE VISIT (OUTPATIENT)
Dept: RADIATION ONCOLOGY | Facility: HOSPITAL | Age: 67
End: 2022-06-30

## 2022-06-30 VITALS
SYSTOLIC BLOOD PRESSURE: 142 MMHG | TEMPERATURE: 97 F | BODY MASS INDEX: 28.77 KG/M2 | RESPIRATION RATE: 18 BRPM | HEART RATE: 72 BPM | WEIGHT: 200.5 LBS | OXYGEN SATURATION: 96 % | DIASTOLIC BLOOD PRESSURE: 64 MMHG

## 2022-06-30 DIAGNOSIS — C61 PROSTATE CANCER: ICD-10-CM

## 2022-06-30 PROCEDURE — G0463 HOSPITAL OUTPT CLINIC VISIT: HCPCS | Performed by: RADIOLOGY

## 2022-06-30 RX ORDER — SILDENAFIL CITRATE 20 MG/1
TABLET ORAL
COMMUNITY
Start: 2022-05-27

## 2022-06-30 NOTE — PROGRESS NOTES
FOLLOW UP NOTE    PATIENT:                                                      Angelo Martin  MEDICAL RECORD #:                        [id number]  :                                                          1955  COMPLETION DATE:    2021  DIAGNOSIS:     Prostate cancer (HCC)  - Stage I (cT1c, cN0, cM0, PSA: 9.4, Grade Group: 1)    BRIEF HISTORY:  Mr. Martin returns to clinic today for a scheduled follow-up visit.  He is a 66-year-old gentleman who was diagnosed last year with a early stage, favorable risk prostate cancer.  During his work-up, we completed a colonoscopy which unfortunately demonstrated a tubulovillous adenoma with high-grade changes in the right colon.  He therefore required a right hemicolectomy prior to receiving radiation treatments, which again confirmed a tubulovillous adenoma, but there was no evidence of invasive disease and no involvement of 19 lymph nodes.  He then received CyberKnife radiosurgery which she completed on 2021.  Following treatment, he had a very short period of lower urinary tract symptoms which he states resolved and he has been doing well since that time.  His IPSS score today is 4 with symptoms only significant for urgency and weak stream less than 20% of the time nocturia x1.  He remains on Flomax and is quite pleased.  He had a PSA 1 month ago that measured 1.5 NG/mL.    MEDICATIONS: Medication reconciliation for the patient was reviewed and confirmed in the electronic medical record.    Review of Systems   All other systems reviewed and are negative.        IPSS Questionnaire (AUA-7):  Over the past month…    1)  Incomplete Emptying  How often have you had a sensation of not emptying your bladder?  1 - Less than 1 time in 5   2)  Frequency  How often have you had to urinate less than every two hours? 0 - Not at all   3)  Intermittency  How often have you found you stopped and started again several times when you urinated?  0 - Not at all    4) Urgency  How often have you found it difficult to postpone urination?  1 - Less than 1 time in 5   5) Weak Stream  How often have you had a weak urinary stream?  1 - Less than 1 time in 5   6) Straining  How often have you had to push or strain to begin urination?  0 - Not at all   7) Nocturia  How many times did you typically get up at night to urinate?  1 - 1 time   Total Score:  4       Quality of life due to urinary symptoms:  If you were to spend the rest of your life with your urinary condition the way it is now, how would you feel about that? 1-Pleased   Urine Leakage (Incontinence) 0-No Leakage     Sexual Health Inventory  Current Status    1)  How do you rate your confidence that you could achieve and keep an erection? 1-Very Low   2) When you had erections with sexual stimulation, how often were your erections hard enough for penetration (entering your partner)? 1-Almost never or never   3)  During sexual intercourse, how often were you able to maintain your erection after you had penetrated (entered) into your partner? 1-Almost never or never   4) During sexual intercourse, how difficult was it to maintain your erection to completion of intercourse? 1-Extremely difficult   5) When you attempted sexual intercourse, how often was it satisfactory to you? 4-Most times (much more than half the time)   Total Score: 8       Bowel Health Inventory  Current Status: 0-No problems, no rectal bleeding, no discharge, less then 5 bowel movements a day         Karnofsky score: 90     Physical Exam  Vitals and nursing note reviewed.   Constitutional:       General: He is not in acute distress.     Appearance: He is well-developed.   HENT:      Head: Normocephalic and atraumatic.   Eyes:      Conjunctiva/sclera: Conjunctivae normal.      Pupils: Pupils are equal, round, and reactive to light.   Cardiovascular:      Rate and Rhythm: Normal rate and regular rhythm.      Heart sounds: No murmur heard.    No friction  rub.   Pulmonary:      Effort: Pulmonary effort is normal.      Breath sounds: Normal breath sounds. No wheezing.   Abdominal:      General: Bowel sounds are normal. There is no distension.      Palpations: Abdomen is soft. There is no mass.      Tenderness: There is no abdominal tenderness.      Comments: Well-healed midline abdominal incision.  Normal bowel sounds.  No discomfort.   Musculoskeletal:         General: Normal range of motion.      Cervical back: Normal range of motion and neck supple.   Lymphadenopathy:      Cervical: No cervical adenopathy.   Skin:     General: Skin is warm and dry.   Neurological:      Mental Status: He is alert and oriented to person, place, and time.   Psychiatric:         Behavior: Behavior normal.         Thought Content: Thought content normal.         Judgment: Judgment normal.         VITAL SIGNS:   Vitals:    06/30/22 1456   BP: 142/64   Pulse: 72   Resp: 18   Temp: 97 °F (36.1 °C)   TempSrc: Temporal   SpO2: 96%   Weight: 90.9 kg (200 lb 8 oz)   PainSc: 0-No pain             Karnofsky score: 90     LABORATORY  PSA 5/27/2022: 1.5 ng/ml    The following portions of the patient's history were reviewed and updated as appropriate: allergies, current medications, past family history, past medical history, past social history, past surgical history and problem list.         Diagnoses and all orders for this visit:    1. Prostate cancer (HCC)         IMPRESSION: Mr. Martin is a 66-year-old gentleman with a history of a favorable, low risk prostate cancer.  He is now just over 6 months out from completion of therapy and clinically is doing very well with a good early response and otherwise downtrending in his PSA.  I am very pleased that he is having minimal lower urinary tract symptoms as well.  I discussed with him the long-term survivorship model, and he is already scheduled to be seen again by Dr. Bland and December when he will have a repeat PSA.  I will plan to see him back my  clinic in 1 year.    RECOMMENDATIONS: Return to clinic in 1 year    I spent a total of 30 minutes on todays visit, with more than 20 minutes in direct face to face communication, and the remainder of the time spent in reviewing the relevant history, records, available imaging, and for documentation.    Return in about 1 year (around 6/30/2023) for Office Visit.    Abran Lorenzo MD

## 2023-07-06 ENCOUNTER — HOSPITAL ENCOUNTER (OUTPATIENT)
Dept: RADIATION ONCOLOGY | Facility: HOSPITAL | Age: 68
Setting detail: RADIATION/ONCOLOGY SERIES
Discharge: HOME | End: 2023-07-06
Payer: MEDICARE

## 2023-07-06 PROCEDURE — G0463 HOSPITAL OUTPT CLINIC VISIT: HCPCS

## 2024-02-19 ENCOUNTER — OFFICE VISIT (OUTPATIENT)
Dept: FAMILY MEDICINE CLINIC | Facility: CLINIC | Age: 69
End: 2024-02-19
Payer: MEDICARE

## 2024-02-19 VITALS
RESPIRATION RATE: 18 BRPM | HEART RATE: 76 BPM | HEIGHT: 70 IN | DIASTOLIC BLOOD PRESSURE: 74 MMHG | WEIGHT: 205 LBS | BODY MASS INDEX: 29.35 KG/M2 | TEMPERATURE: 97.5 F | SYSTOLIC BLOOD PRESSURE: 152 MMHG

## 2024-02-19 DIAGNOSIS — Z13.6 ENCOUNTER FOR ABDOMINAL AORTIC ANEURYSM (AAA) SCREENING: ICD-10-CM

## 2024-02-19 DIAGNOSIS — F17.210 CIGARETTE NICOTINE DEPENDENCE WITHOUT COMPLICATION: ICD-10-CM

## 2024-02-19 DIAGNOSIS — Z13.220 ENCOUNTER FOR LIPID SCREENING FOR CARDIOVASCULAR DISEASE: ICD-10-CM

## 2024-02-19 DIAGNOSIS — Z00.00 MEDICARE ANNUAL WELLNESS VISIT, INITIAL: Primary | ICD-10-CM

## 2024-02-19 DIAGNOSIS — Z12.11 COLON CANCER SCREENING: ICD-10-CM

## 2024-02-19 DIAGNOSIS — Z23 NEED FOR PNEUMOCOCCAL VACCINATION: ICD-10-CM

## 2024-02-19 DIAGNOSIS — R06.02 SHORTNESS OF BREATH: ICD-10-CM

## 2024-02-19 DIAGNOSIS — C61 PROSTATE CANCER: ICD-10-CM

## 2024-02-19 DIAGNOSIS — Z13.6 ENCOUNTER FOR LIPID SCREENING FOR CARDIOVASCULAR DISEASE: ICD-10-CM

## 2024-02-19 DIAGNOSIS — Z12.2 ENCOUNTER FOR SCREENING FOR LUNG CANCER: ICD-10-CM

## 2024-02-19 PROCEDURE — 99203 OFFICE O/P NEW LOW 30 MIN: CPT | Performed by: FAMILY MEDICINE

## 2024-02-19 PROCEDURE — 1160F RVW MEDS BY RX/DR IN RCRD: CPT | Performed by: FAMILY MEDICINE

## 2024-02-19 PROCEDURE — 1159F MED LIST DOCD IN RCRD: CPT | Performed by: FAMILY MEDICINE

## 2024-02-19 PROCEDURE — 1170F FXNL STATUS ASSESSED: CPT | Performed by: FAMILY MEDICINE

## 2024-02-19 PROCEDURE — G0439 PPPS, SUBSEQ VISIT: HCPCS | Performed by: FAMILY MEDICINE

## 2024-02-19 NOTE — PROGRESS NOTES
The ABCs of the Annual Wellness Visit  Initial Medicare Wellness Visit    Subjective    Angelo Martin is a 68 y.o. male who presents for a Initial Medicare Wellness Visit.    The following portions of the patient's history were reviewed and   updated as appropriate: allergies, current medications, past family history, past medical history, past social history, past surgical history, and problem list.    Compared to one year ago, the patient feels his physical   health is the same.    Compared to one year ago, the patient feels his mental   health is the same.    Recent Hospitalizations:  He was not admitted to the hospital during the last year.       Current Medical Providers:  Patient Care Team:  Héctor Olivas MD as PCP - General (Family Medicine)  Hola Bland MD as Referring Physician (Urology)  Abran Lorenzo MD as Consulting Physician (Radiation Oncology)  Hemant De Oliveira MD as Consulting Physician (Gastroenterology)    Outpatient Medications Prior to Visit   Medication Sig Dispense Refill    naproxen sodium (ALEVE) 220 MG tablet Take 1 tablet by mouth Every 12 (Twelve) Hours As Needed for Mild Pain. OTC      tamsulosin (FLOMAX) 0.4 MG capsule 24 hr capsule Take 1 capsule by mouth Every Morning. (Patient taking differently: Take 1 capsule by mouth Every Other Day.) 30 capsule 11    sildenafil (REVATIO) 20 MG tablet TAKE 1 TO FIVE TABLETS BY MOUTH AS NEEDED      aspirin 81 MG tablet Take 1 tablet by mouth Daily.      docusate sodium 100 MG capsule Take 1 capsule by mouth 2 (Two) Times a Day. 20 capsule 0     No facility-administered medications prior to visit.       No opioid medication identified on active medication list. I have reviewed chart for other potential  high risk medication/s and harmful drug interactions in the elderly.        Aspirin is not on active medication list.  Aspirin use is not indicated based on review of current medical condition/s. Risk of harm outweighs  "potential benefits.  .    Patient Active Problem List   Diagnosis    Tobacco abuse    Benign non-nodular prostatic hyperplasia    Mixed hyperlipidemia    Reduced libido    Fatigue    Hematuria    Hypogonadism in male    Prostate cancer    Adenomatous polyp of ascending colon    Colon polyp     Advance Care Planning   Advance Care Planning     Advance Directive is not on file.  ACP discussion was held with the patient during this visit. He is aware     Objective    Vitals:    24 1356 24 1448   BP: 140/78 152/74   Pulse: 76    Resp: 18    Temp: 97.5 °F (36.4 °C)    Weight: 93 kg (205 lb)    Height: 177.8 cm (70\")    PainSc: 0-No pain      Estimated body mass index is 29.41 kg/m² as calculated from the following:    Height as of this encounter: 177.8 cm (70\").    Weight as of this encounter: 93 kg (205 lb).    BMI is >= 25 and <30. (Overweight) The following options were offered after discussion;: exercise counseling/recommendations and nutrition counseling/recommendations      Does the patient have evidence of cognitive impairment? No          HEALTH RISK ASSESSMENT    Smoking Status:  Social History     Tobacco Use   Smoking Status Every Day    Packs/day: 1.00    Years: 40.00    Additional pack years: 0.00    Total pack years: 40.00    Types: Cigarettes   Smokeless Tobacco Never     Alcohol Consumption:  Social History     Substance and Sexual Activity   Alcohol Use Yes    Alcohol/week: 15.0 standard drinks of alcohol    Types: 5 Shots of liquor, 10 Standard drinks or equivalent per week    Comment: Kayce     Fall Risk Screen:    MUKESHADI Fall Risk Assessment was completed, and patient is at LOW risk for falls.Assessment completed on:2024    Depression Screenin/19/2024     1:57 PM   PHQ-2/PHQ-9 Depression Screening   Little Interest or Pleasure in Doing Things 0-->not at all   Feeling Down, Depressed or Hopeless 0-->not at all   PHQ-9: Brief Depression Severity Measure Score 0       Health " Habits and Functional and Cognitive Screenin/19/2024     1:57 PM   Functional & Cognitive Status   Do you have difficulty preparing food and eating? No   Do you have difficulty bathing yourself, getting dressed or grooming yourself? No   Do you have difficulty using the toilet? No   Do you have difficulty moving around from place to place? No   Do you have trouble with steps or getting out of a bed or a chair? No   Current Diet Well Balanced Diet   Dental Exam Not up to date   Eye Exam Not up to date   Exercise (times per week) 0 times per week   Current Exercises Include No Regular Exercise   Do you need help using the phone?  No   Are you deaf or do you have serious difficulty hearing?  No   Do you need help to go to places out of walking distance? No   Do you need help shopping? No   Do you need help preparing meals?  No   Do you need help with housework?  No   Do you need help with laundry? No   Do you need help taking your medications? No   Do you need help managing money? No   Do you ever drive or ride in a car without wearing a seat belt? Yes   Have you felt unusual stress, anger or loneliness in the last month? No   Who do you live with? Spouse   If you need help, do you have trouble finding someone available to you? No   Have you been bothered in the last four weeks by sexual problems? No   Do you have difficulty concentrating, remembering or making decisions? No       Age-appropriate Screening Schedule:  Refer to the list below for future screening recommendations based on patient's age, sex and/or medical conditions. Orders for these recommended tests are listed in the plan section. The patient has been provided with a written plan.    Health Maintenance   Topic Date Due    Pneumococcal Vaccine 65+ (1 of 2 - PCV) Never done    TDAP/TD VACCINES (1 - Tdap) Never done    LUNG CANCER SCREENING  Never done    HEPATITIS C SCREENING  Never done    ANNUAL WELLNESS VISIT  Never done    LIPID PANEL   03/10/2018    BMI FOLLOWUP  08/20/2022    AAA SCREEN (ONE-TIME)  Never done    INFLUENZA VACCINE  03/31/2024 (Originally 8/1/2023)    COVID-19 Vaccine (1) 08/19/2024 (Originally 10/19/1960)    RSV Vaccine - Adults (1 - 1-dose 60+ series) 02/19/2025 (Originally 10/19/2016)    ZOSTER VACCINE (1 of 2) 02/19/2025 (Originally 10/19/1975)    COLORECTAL CANCER SCREENING  09/14/2031                  CMS Preventative Services Quick Reference  Risk Factors Identified During Encounter  Hearing Problem:  see a/p  Dental Screening Recommended  Vision Screening Recommended  The above risks/problems have been discussed with the patient.  Pertinent information has been shared with the patient in the After Visit Summary.  An After Visit Summary and PPPS were made available to the patient.    Follow Up:   Next Medicare Wellness visit to be scheduled in 1 year.       Additional E&M Note during same encounter follows:  Patient has multiple medical problems which are significant and separately identifiable that require additional work above and beyond the Medicare Wellness Visit.      Chief Complaint  Medicare Wellness-subsequent    Subjective        HPI  Angelokenyetta Martin is also being seen today for       History of Present Illness  The patient presents for a Medicare wellness visit.    He was last seen in 2017. He had prostate cancer. He did not have his prostate removed. He had CyberKnife done by Dr. Lorenzo. His last visit with him was in 07/2023. He is scheduled to see him again on 07/08/2025. His last PSA was in 11/2023 with Dr. Bland. It had gone from 9 down to almost 0. He takes tamsulosin every other day, which seems to help. He takes sildenafil as needed, but it does not work very well.    Some days, he has shortness of breath and some days, his left leg will go numb. It is not completely numb, but he can feel it on the back of his buttocks and down the part of his leg. It does not bother him when he is sitting in the car.  Sometimes, he can stand up at work and it is okay if he is moving. It is not constant.     He had part of his colon removed in 10/2021 by Dr. Macias. He has been okay since.    Some days are worse than others. He denies any chest pain or nausea. He gets sweaty only when he works hard. He gets short of breath with lifting things. He feels himself getting weaker. 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 He does not cough up any blood.    He does not check his blood pressure at home. Sometimes, his blood pressure goes up when he first comes to the doctor's office.    He has not received his influenza vaccine in the last couple of years. He has received all of his COVID-19 vaccines. He has had COVID-19 twice. He has not received the latest COVID-19 booster. He has not received his RSV vaccine. He has not received his shingles vaccine.    He eats healthy foods. He does not exercise formally, but he is very active. He does not have a living will. He is a little forgetful. He struggles to focus on something to remember. He takes some vitamins every day. His bowels are moving okay. He denies any blood in his stool. He is urinating good. His shoulders bother him sometimes. He played baseball and softball all his life until he was 53 years old. He has had spells where he gets dizzy, but not every day. He has some hearing loss. His mood has been okay. He is always anxious to get stuff done. He has noticed that his hips bother him more.   He smokes a pack or less of cigarettes a day. He has been smoking for at least 40 years. He drinks alcohol occasionally.   His brother had prostate cancer at the same age he was.   He has received his COVID-19 vaccines.He had melanoma on the right side of his neck. He sees a dermatologist whenever he sees a new spot.       Review of Systems   Constitutional: Negative.    HENT: Negative.  Positive for hearing loss.    Respiratory: Negative.  Positive for shortness of breath.    Cardiovascular:   "Negative for chest pain.   Gastrointestinal: Negative.  Negative for blood in stool.   Genitourinary:  Negative for difficulty urinating.   Musculoskeletal: Negative.  Positive for arthralgias (shoulders , hips and knees).   Neurological:  Positive for dizziness (at times). Negative for syncope.   Psychiatric/Behavioral: Negative.  Negative for dysphoric mood. The patient is not nervous/anxious.        Objective   Vital Signs:  /74   Pulse 76   Temp 97.5 °F (36.4 °C)   Resp 18   Ht 177.8 cm (70\")   Wt 93 kg (205 lb)   BMI 29.41 kg/m²     Physical Exam  Vitals and nursing note reviewed.   Constitutional:       General: He is not in acute distress.     Appearance: Normal appearance. He is well-developed. He is not ill-appearing.   HENT:      Head: Normocephalic and atraumatic.      Right Ear: Tympanic membrane, ear canal and external ear normal. Decreased hearing noted.      Left Ear: Tympanic membrane, ear canal and external ear normal. Decreased hearing noted.      Nose: Nose normal. No congestion or rhinorrhea.      Mouth/Throat:      Mouth: Mucous membranes are moist.      Pharynx: No oropharyngeal exudate or posterior oropharyngeal erythema.   Eyes:      General: Lids are normal.      Conjunctiva/sclera: Conjunctivae normal.      Pupils: Pupils are equal, round, and reactive to light.   Neck:      Thyroid: No thyromegaly.   Cardiovascular:      Rate and Rhythm: Normal rate and regular rhythm.      Heart sounds: Normal heart sounds. No murmur heard.     No friction rub.   Pulmonary:      Effort: Pulmonary effort is normal. No respiratory distress.      Breath sounds: Normal breath sounds. No wheezing or rales.   Abdominal:      General: Bowel sounds are normal. There is no distension.      Palpations: Abdomen is soft. There is no mass.      Tenderness: There is no abdominal tenderness. There is no guarding or rebound.   Musculoskeletal:      Right lower leg: No edema.      Left lower leg: No edema. "   Skin:     General: Skin is warm and dry.   Neurological:      General: No focal deficit present.      Mental Status: He is alert.   Psychiatric:         Mood and Affect: Mood normal.         Speech: Speech normal.         Behavior: Behavior normal.                         Assessment and Plan   Diagnoses and all orders for this visit:    1. Medicare annual wellness visit, initial (Primary)    2. Shortness of breath  -     CBC & Differential; Future  -     Comprehensive Metabolic Panel; Future    3. Encounter for screening for lung cancer    4. Cigarette nicotine dependence without complication  -     CT Chest Low Dose Wo; Future  -     [place] aaa screen limited; Future    5. Prostate cancer    6. Colon cancer screening  -     Ambulatory Referral For Screening Colonoscopy    7. Need for pneumococcal vaccination  -     Pneumococcal Conjugate Vaccine 20-Valent (PCV20)    8. Encounter for abdominal aortic aneurysm (AAA) screening  -     [place] aaa screen limited; Future    9. Encounter for lipid screening for cardiovascular disease  -     Comprehensive Metabolic Panel; Future  -     Lipid Panel With / Chol / HDL Ratio; Future          Assessment & Plan  1. Medicare wellness visit.  He will continue routine health maintenance including routine dentistry, eye exam, safety, seatbelt use, exercise, and proper nutrition.    2. Shortness of breath.  Unclear etiology at this point. We will continue to evaluate. He is due for a low-dose lung cancer screening CT, so we will get set up for that. He is not having any chest pain or exertional type symptoms that would suggest coronary symptoms.  Check labs as well including CBC and CMP.    3. Elevated blood pressure.  Blood pressure is elevated here in the office today. He states his wife is a nurse and he will check with her to see if she has a blood pressure cuff to check his blood pressure and I recommend that is checked 3 times a week. I recommend that he check that at home.    4.  Screening for colonoscopy.  He had a colon resection done in 2021 due to tubular adenoma with high-grade dysplasia. I will get him set up with Dr. Barakat who had initially done the colonoscopy.    5. Screening for aortic aneurysm.  He agrees to have the ultrasound done for aneurysm.    6. Mild hearing loss.  He will continue to monitor. If it is worsening, we will refer for evaluation.    7. Prostate cancer.  He will continue to follow up with radiation oncology and urology. Once labs are back, I will check CBC, CMP, and lipid panel.    Follow-up  Once labs are back.     Recommend consideration for RSV vaccination, shingles vaccine and he will follow-up and do pneumonia shot on Wednesday this week when he comes in for his blood work         Follow Up   Return if symptoms worsen or fail to improve, for follow up depends on review of labs and testing.  Patient was given instructions and counseling regarding his condition or for health maintenance advice. Please see specific information pulled into the AVS if appropriate.       Héctor Olivas MD

## 2024-02-21 ENCOUNTER — LAB (OUTPATIENT)
Dept: FAMILY MEDICINE CLINIC | Facility: CLINIC | Age: 69
End: 2024-02-21
Payer: COMMERCIAL

## 2024-02-21 DIAGNOSIS — R06.02 SHORTNESS OF BREATH: ICD-10-CM

## 2024-02-21 DIAGNOSIS — Z13.6 ENCOUNTER FOR LIPID SCREENING FOR CARDIOVASCULAR DISEASE: ICD-10-CM

## 2024-02-21 DIAGNOSIS — Z13.220 ENCOUNTER FOR LIPID SCREENING FOR CARDIOVASCULAR DISEASE: ICD-10-CM

## 2024-02-22 LAB
ALBUMIN SERPL-MCNC: 4.3 G/DL (ref 3.5–5.2)
ALBUMIN/GLOB SERPL: 1.8 G/DL
ALP SERPL-CCNC: 84 U/L (ref 39–117)
ALT SERPL-CCNC: 25 U/L (ref 1–41)
AST SERPL-CCNC: 21 U/L (ref 1–40)
BASOPHILS # BLD AUTO: 0.1 10*3/MM3 (ref 0–0.2)
BASOPHILS NFR BLD AUTO: 1.1 % (ref 0–1.5)
BILIRUB SERPL-MCNC: 0.4 MG/DL (ref 0–1.2)
BUN SERPL-MCNC: 16 MG/DL (ref 8–23)
BUN/CREAT SERPL: 20.5 (ref 7–25)
CALCIUM SERPL-MCNC: 9.1 MG/DL (ref 8.6–10.5)
CHLORIDE SERPL-SCNC: 102 MMOL/L (ref 98–107)
CHOLEST SERPL-MCNC: 216 MG/DL (ref 0–200)
CHOLEST/HDLC SERPL: 5.14 {RATIO}
CO2 SERPL-SCNC: 26.8 MMOL/L (ref 22–29)
CREAT SERPL-MCNC: 0.78 MG/DL (ref 0.76–1.27)
EGFRCR SERPLBLD CKD-EPI 2021: 97.7 ML/MIN/1.73
EOSINOPHIL # BLD AUTO: 0.32 10*3/MM3 (ref 0–0.4)
EOSINOPHIL NFR BLD AUTO: 3.5 % (ref 0.3–6.2)
ERYTHROCYTE [DISTWIDTH] IN BLOOD BY AUTOMATED COUNT: 13 % (ref 12.3–15.4)
GLOBULIN SER CALC-MCNC: 2.4 GM/DL
GLUCOSE SERPL-MCNC: 111 MG/DL (ref 65–99)
HCT VFR BLD AUTO: 47.6 % (ref 37.5–51)
HDLC SERPL-MCNC: 42 MG/DL (ref 40–60)
HGB BLD-MCNC: 16.1 G/DL (ref 13–17.7)
IMM GRANULOCYTES # BLD AUTO: 0.03 10*3/MM3 (ref 0–0.05)
IMM GRANULOCYTES NFR BLD AUTO: 0.3 % (ref 0–0.5)
LDLC SERPL CALC-MCNC: 145 MG/DL (ref 0–100)
LYMPHOCYTES # BLD AUTO: 2.86 10*3/MM3 (ref 0.7–3.1)
LYMPHOCYTES NFR BLD AUTO: 31.5 % (ref 19.6–45.3)
MCH RBC QN AUTO: 31.2 PG (ref 26.6–33)
MCHC RBC AUTO-ENTMCNC: 33.8 G/DL (ref 31.5–35.7)
MCV RBC AUTO: 92.2 FL (ref 79–97)
MONOCYTES # BLD AUTO: 0.79 10*3/MM3 (ref 0.1–0.9)
MONOCYTES NFR BLD AUTO: 8.7 % (ref 5–12)
NEUTROPHILS # BLD AUTO: 4.98 10*3/MM3 (ref 1.7–7)
NEUTROPHILS NFR BLD AUTO: 54.9 % (ref 42.7–76)
NRBC BLD AUTO-RTO: 0 /100 WBC (ref 0–0.2)
PLATELET # BLD AUTO: 270 10*3/MM3 (ref 140–450)
POTASSIUM SERPL-SCNC: 4.8 MMOL/L (ref 3.5–5.2)
PROT SERPL-MCNC: 6.7 G/DL (ref 6–8.5)
RBC # BLD AUTO: 5.16 10*6/MM3 (ref 4.14–5.8)
SODIUM SERPL-SCNC: 139 MMOL/L (ref 136–145)
TRIGL SERPL-MCNC: 158 MG/DL (ref 0–150)
VLDLC SERPL CALC-MCNC: 29 MG/DL (ref 5–40)
WBC # BLD AUTO: 9.08 10*3/MM3 (ref 3.4–10.8)

## 2024-05-06 RX ORDER — SODIUM, POTASSIUM,MAG SULFATES 17.5-3.13G
1 SOLUTION, RECONSTITUTED, ORAL ORAL TAKE AS DIRECTED
Qty: 354 ML | Refills: 0 | Status: SHIPPED | OUTPATIENT
Start: 2024-05-06

## 2024-06-05 RX ORDER — SODIUM, POTASSIUM,MAG SULFATES 17.5-3.13G
1 SOLUTION, RECONSTITUTED, ORAL ORAL TAKE AS DIRECTED
Qty: 354 ML | Refills: 0 | Status: SHIPPED | OUTPATIENT
Start: 2024-06-05

## 2024-06-12 ENCOUNTER — OUTSIDE FACILITY SERVICE (OUTPATIENT)
Dept: GASTROENTEROLOGY | Facility: CLINIC | Age: 69
End: 2024-06-12
Payer: MEDICARE

## 2024-06-12 PROCEDURE — 88305 TISSUE EXAM BY PATHOLOGIST: CPT | Performed by: INTERNAL MEDICINE

## 2024-06-12 PROCEDURE — 45385 COLONOSCOPY W/LESION REMOVAL: CPT | Performed by: INTERNAL MEDICINE

## 2024-06-13 ENCOUNTER — LAB REQUISITION (OUTPATIENT)
Dept: LAB | Facility: HOSPITAL | Age: 69
End: 2024-06-13
Payer: MEDICARE

## 2024-06-13 DIAGNOSIS — K57.30 DIVERTICULOSIS OF LARGE INTESTINE WITHOUT PERFORATION OR ABSCESS WITHOUT BLEEDING: ICD-10-CM

## 2024-06-13 DIAGNOSIS — K64.8 OTHER HEMORRHOIDS: ICD-10-CM

## 2024-06-13 DIAGNOSIS — Z86.010 PERSONAL HISTORY OF COLONIC POLYPS: ICD-10-CM

## 2024-06-13 DIAGNOSIS — D12.3 BENIGN NEOPLASM OF TRANSVERSE COLON: ICD-10-CM

## 2024-06-13 DIAGNOSIS — Z12.11 ENCOUNTER FOR SCREENING FOR MALIGNANT NEOPLASM OF COLON: ICD-10-CM

## 2024-06-14 LAB — REF LAB TEST METHOD: NORMAL

## 2024-06-19 ENCOUNTER — TELEPHONE (OUTPATIENT)
Dept: GASTROENTEROLOGY | Facility: CLINIC | Age: 69
End: 2024-06-19
Payer: MEDICARE

## 2024-06-19 NOTE — TELEPHONE ENCOUNTER
----- Message from Hemant De Oliveira sent at 6/19/2024  1:39 PM EDT -----  Let Mr. Martin know there was 1 adenoma polyp. He will need a repeat colonoscopy in 3 years.

## 2024-07-08 ENCOUNTER — HOSPITAL ENCOUNTER (OUTPATIENT)
Dept: RADIATION ONCOLOGY | Facility: HOSPITAL | Age: 69
Setting detail: RADIATION/ONCOLOGY SERIES
Discharge: HOME | End: 2024-07-08
Payer: MEDICARE

## 2024-07-08 ENCOUNTER — OFFICE VISIT (OUTPATIENT)
Dept: RADIATION ONCOLOGY | Facility: HOSPITAL | Age: 69
End: 2024-07-08
Payer: MEDICARE

## 2024-07-08 DIAGNOSIS — C61 PROSTATE CANCER: Primary | ICD-10-CM

## 2024-07-08 NOTE — PROGRESS NOTES
TELEMEDICINE FOLLOW UP NOTE    PATIENT:                                                      Angelo Martin  MEDICAL RECORD #:                        [id number]  :                                                          1955  COMPLETION DATE:   2021  DIAGNOSIS:     Prostate cancer  - Stage I (cT1c, cN0, cM0, PSA: 9.4, Grade Group: 1)    This visit has been converted to a telehealth virtual visit, the patient's preferred method for today's follow-up.  Total time of discussion was 7 minutes.  The patient has given verbal consent.      BRIEF HISTORY:   Mr. Martin is a 68 y.o. gentleman with a history of an early stage, favorable risk prostate cancer.  He underwent definitive treatment with CyberKnife SBRT which he completed in 2021.  He tolerated treatment well.  From a symptom standpoint, he endorses stable IPSS score of 9 with most notable symptoms of urinary urgency and nocturia x2, unchanged from prior.  He reports using Flomax every 2 to 3 days, and can tell a difference on the days he skips his medication.  He denies hematuria, dysuria, or urinary incontinence.  He reports bowel function is normal.  He recently underwent screening colonoscopy in 2024 identifying 1 adenoma polyp with recommendations to repeat colonoscopy in 3 years.  He continues sildenafil as needed for management of ED.  His most recent PSA on 2023 was 0.36 NG/mL, up slightly from prior value of 0.09 NG/mL.      IPSS Questionnaire (AUA-7):  Over the past month…     1)  Incomplete Emptying  How often have you had a sensation of not emptying your bladder?  0 - Not at all   2)  Frequency  How often have you had to urinate less than every two hours? 1 - Less than 1 time in 5   3)  Intermittency  How often have you found you stopped and started again several times when you urinated?  1 - Less than 1 time in 5   4) Urgency  How often have you found it difficult to postpone urination?  3 - About half the time   5) Weak  Stream  How often have you had a weak urinary stream?  1 - Less than 1 time in 5   6) Straining  How often have you had to push or strain to begin urination?  1 - Less than 1 time in 5   7) Nocturia  How many times did you typically get up at night to urinate?  2 - 2 times   Total Score:  9         Quality of life due to urinary symptoms:  If you were to spend the rest of your life with your urinary condition the way it is now, how would you feel about that? 1-Pleased   Urine Leakage (Incontinence) 1-Mild (A few drops a day, no pad use)      Sexual Health Inventory  Current Status     1)  How do you rate your confidence that you could achieve and keep an erection? 1-Very Low   2) When you had erections with sexual stimulation, how often were your erections hard enough for penetration (entering your partner)? 1-Almost never or never   3)  During sexual intercourse, how often were you able to maintain your erection after you had penetrated (entered) into your partner? 1-Almost never or never   4) During sexual intercourse, how difficult was it to maintain your erection to completion of intercourse? 1-Extremely difficult   5) When you attempted sexual intercourse, how often was it satisfactory to you? 4   Total Score: 8         Bowel Health Inventory  Current Status: 0-No problems, no rectal bleeding, no discharge, less then 5 bowel movements a day          He has had a nicely downtrending in his PSA, with his most recent value from May 2023 measuring 0.09 NG/mL.  From a symptomatic standpoint, his IPSS score today is 9 and he continues to have some symptoms of urgency and nocturia, and he uses Flomax.  He is very pleased and says th        MEDICATIONS: Medication reconciliation for the patient was reviewed and confirmed in the electronic medical record.    Review of Systems - Oncology        KPS 90%      Physical Exam  Pulmonary:      Respirations even, unlabored. No audible wheezing or cough.  Neurological:       A+Ox4, conversant, answers questions appropriately.  Psychiatric:     Judgement, affect, and decision-making WNL.    Limited physical exam as visit was conducted remotely via telephone.    LABORATORY:  PSA 2/24/2022 = 1.6 NG/mL  PSA 5/27/2022 = 1.5 NG/mL  PSA 11/22/2022 = 1.1 NG/mL   PSA 5/23/2023 = 0.09 NG/mL  PSA 11/28/2023 = 0.36 NG/mL        The following portions of the patient's history were reviewed and updated as appropriate: allergies, current medications, past family history, past medical history, past social history, past surgical history and problem list.         Diagnoses and all orders for this visit:    1. Prostate cancer (Primary)         IMPRESSION:  Mr. Martin is a 68 y.o. gentleman with history of a clinical T1c, Amarilis 3+3 = 6 prostate adenocarcinoma with a pretreatment PSA of 9.4 NG/mL.   He is more than 2.5 years status post CyberKnife SBRT.  He tolerated treatment well and is without symptomology to suggest late radiation related toxicity at this time.  He continues Flomax QOD or Q3 days for management of mild lower urinary tract symptoms.  We discussed that he can trial further tapering/discontinuation if he would like.  He has had an excellent biochemical response to treatment with appropriate PSA reduction, having reached target nora value <0.5 NG/mL.  Most recent PSA of 0.36 NG/mL is up slightly from prior, but does not yet meet definition of biochemical recurrence.  Continue serial PSA monitoring through his urologist.  In the event that PSA increases on 2 consecutive occasions and exceeds a value of 2 NG/mL, then the patient is advised to return to our clinic at that time.    The patient's PCP is managing upcoming low-dose CT scan of the chest as well as [place] AAA screening.    RECOMMENDATIONS:   Continue routine urologic surveillance under the direction of Dr. Bland, with follow-up and next PSA scheduled in 11/2024.  As long as he remains in biochemical disease remission or unless  clinically indicated, then we will remain available as needed.  He should return to our clinic in the event that PSA rises on 2 consecutive occasions exceeding a value of 2 NG/mL.      Return if symptoms worsen or fail to improve, for Office Visit.    ASHISH Lisa      I spent a total of 20 minutes on today's visit, with more than 7 minutes in virtual communication with the patient via telephone, and the remainder of the time spent in reviewing the relevant history, records, available imaging, and for documentation.

## 2024-07-17 ENCOUNTER — HOSPITAL ENCOUNTER (OUTPATIENT)
Dept: CT IMAGING | Facility: HOSPITAL | Age: 69
Discharge: HOME | End: 2024-07-17
Payer: MEDICARE

## 2024-07-17 ENCOUNTER — HOSPITAL ENCOUNTER (OUTPATIENT)
Dept: ULTRASOUND IMAGING | Facility: HOSPITAL | Age: 69
Discharge: HOME | End: 2024-07-17
Payer: MEDICARE

## 2024-07-17 DIAGNOSIS — F17.210 CIGARETTE NICOTINE DEPENDENCE WITHOUT COMPLICATION: ICD-10-CM

## 2024-07-17 DIAGNOSIS — Z13.6 ENCOUNTER FOR ABDOMINAL AORTIC ANEURYSM (AAA) SCREENING: ICD-10-CM

## 2024-07-17 PROCEDURE — 76706 US ABDL AORTA SCREEN AAA: CPT

## 2024-07-17 PROCEDURE — 71271 CT THORAX LUNG CANCER SCR C-: CPT

## 2024-07-18 ENCOUNTER — TELEPHONE (OUTPATIENT)
Dept: FAMILY MEDICINE CLINIC | Facility: CLINIC | Age: 69
End: 2024-07-18
Payer: MEDICARE

## 2024-07-18 NOTE — TELEPHONE ENCOUNTER
SPOUSE OF PATIENT HAS CALLED REQUESTING A CALL BACK WITH RESULTS OF ULTRA SOUND THAT WAS DONE YESTERDAY.    CALL BACK NUMBER [phone]

## 2025-07-16 ENCOUNTER — TELEPHONE (OUTPATIENT)
Dept: FAMILY MEDICINE CLINIC | Facility: CLINIC | Age: 70
End: 2025-07-16
Payer: MEDICARE

## 2025-07-16 ENCOUNTER — OFFICE VISIT (OUTPATIENT)
Dept: FAMILY MEDICINE CLINIC | Facility: CLINIC | Age: 70
End: 2025-07-16
Payer: MEDICARE

## 2025-07-16 VITALS
HEIGHT: 70 IN | RESPIRATION RATE: 18 BRPM | SYSTOLIC BLOOD PRESSURE: 150 MMHG | WEIGHT: 195 LBS | TEMPERATURE: 97.5 F | HEART RATE: 68 BPM | DIASTOLIC BLOOD PRESSURE: 84 MMHG | BODY MASS INDEX: 27.92 KG/M2

## 2025-07-16 DIAGNOSIS — R20.0 LEFT LEG NUMBNESS: Primary | ICD-10-CM

## 2025-07-16 PROCEDURE — 1159F MED LIST DOCD IN RCRD: CPT

## 2025-07-16 PROCEDURE — 1126F AMNT PAIN NOTED NONE PRSNT: CPT

## 2025-07-16 PROCEDURE — 1160F RVW MEDS BY RX/DR IN RCRD: CPT

## 2025-07-16 PROCEDURE — 99213 OFFICE O/P EST LOW 20 MIN: CPT

## 2025-07-16 PROCEDURE — G2211 COMPLEX E/M VISIT ADD ON: HCPCS

## 2025-07-16 NOTE — PROGRESS NOTES
Office Note     Name: Angelo Martin    : 1955     MRN: [id number]     Chief Complaint  Left Leg Numbness (Pt reports symptoms have been present for about a year. )    Subjective     History of Present Illness:  Angelo Martin is a 69 y.o. male who presents today with concerns regarding numbness on his posterior left leg. Symptoms have been present for approximately 1 year. Patient states numbness often occurs first thing in the morning or with prolonged sitting. Numbness lasts approximately 10-15 minutes and is present from his left hip to his left knee (posterior). Denies tingling, pins and needles, weakness, altered gait, foot drop, pain, and history of injury.     No other complaints or concerns.      Past Medical History:   Past Medical History:   Diagnosis Date    Asthma     resolved as a small child    Colon polyp     Decreased libido     Elevated cholesterol     Hematuria     Last Impression: 02 Sep 2015  Gross hematuria  [facility] (Urgent [facility])    Melanoma      back 10 yrs ago. Not seen derm in 2 yrs. No lesions. Children noticed it.     Prostate cancer        Past Surgical History:   Past Surgical History:   Procedure Laterality Date    COLON RESECTION Right 10/12/2021    Procedure: LAPAROSCOPIC RIGHT COLECTOMY;  Surgeon: Reuben Macias MD;  Location: [facility];  Service: General;  Laterality: Right;    COLONOSCOPY  2021    PROSTATE BIOPSY      PROSTATE FIDUCIAL MARKER PLACEMENT      SKIN BIOPSY      melanoma    SKIN CANCER EXCISION      melanoma from back  2011       Immunizations: There is no immunization history for the selected administration types on file for this patient.     Medications:     Current Outpatient Medications:     naproxen sodium (ALEVE) 220 MG tablet, Take 1 tablet by mouth Every 12 (Twelve) Hours As Needed for Mild Pain. OTC, Disp: , Rfl:     sildenafil (REVATIO) 20 MG tablet, TAKE 1 TO FIVE TABLETS BY MOUTH AS NEEDED, Disp: , Rfl:      "tamsulosin (FLOMAX) 0.4 MG capsule 24 hr capsule, Take 1 capsule by mouth Every Morning. (Patient taking differently: Take 1 capsule by mouth Every Other Day.), Disp: 30 capsule, Rfl: 11    Allergies:   No Known Allergies    Family History:   Family History   Problem Relation Age of Onset    Heart disease Mother     Heart disease Father     Lung disease Father     Heart disease Sister     Prostate cancer Brother        Social History:   Social History     Socioeconomic History    Marital status:    Tobacco Use    Smoking status: Every Day     Current packs/day: 1.00     Average packs/day: 1 pack/day for 40.0 years (40.0 ttl pk-yrs)     Types: Cigarettes    Smokeless tobacco: Never   Vaping Use    Vaping status: Never Used   Substance and Sexual Activity    Alcohol use: Yes     Alcohol/week: 15.0 standard drinks of alcohol     Types: 5 Shots of liquor, 10 Standard drinks or equivalent per week     Comment: [place]    Drug use: No    Sexual activity: Defer       Objective     Vital Signs  /84   Pulse 68   Temp 97.5 °F (36.4 °C)   Resp 18   Ht 177.8 cm (70.01\")   Wt 88.5 kg (195 lb)   BMI 27.97 kg/m²   Estimated body mass index is 27.97 kg/m² as calculated from the following:    Height as of this encounter: 177.8 cm (70.01\").    Weight as of this encounter: 88.5 kg (195 lb).      Physical Exam  Vitals and nursing note reviewed.   Constitutional:       Appearance: Normal appearance.   HENT:      Head: Normocephalic and atraumatic.   Cardiovascular:      Rate and Rhythm: Normal rate and regular rhythm.      Heart sounds: No murmur heard.     No friction rub. No gallop.   Pulmonary:      Effort: Pulmonary effort is normal.      Breath sounds: Normal breath sounds. No wheezing, rhonchi or rales.   Musculoskeletal:         General: No tenderness, deformity or signs of injury. Normal range of motion.   Skin:     General: Skin is warm and dry.   Neurological:      General: No focal deficit present.      " Mental Status: He is alert and oriented to person, place, and time.      Cranial Nerves: No cranial nerve deficit.      Sensory: No sensory deficit.      Motor: No weakness.      Coordination: Coordination normal.      Gait: Gait normal.   Psychiatric:         Mood and Affect: Mood normal.         Behavior: Behavior normal.          Assessment and Plan     Diagnoses and all orders for this visit:    1. Left leg numbness (Primary)  Assessment & Plan:  Complete nerve study of left lower extremity  Advised patient to frequently change positions and avoid prolonged sitting on hard surfaces  Increase activity  Return if symptoms change or worsen     Orders:  -     EMG 1 Limb; Future         Follow Up  Return in about 6 weeks (around 8/27/2025) for Annual physical.    CAROL Muñoz  [facility]  [address] [facility] [address] [phone]

## 2025-07-16 NOTE — TELEPHONE ENCOUNTER
Caller: Rosemary Martin    Relationship: Emergency Contact    Best call back number: [phone]     What is the best time to reach you: ANY    Who are you requesting to speak with (clinical staff, provider,  specific staff member): CLINICAL      What was the call regarding: PATIENTS WIFE WAS CALLING TO FIND OUT WHAT WAS DISCUSSED AT THE PATIENTS APPOINTMENT ON 7/16. SHE IS ON THE  VERBAL AND WOULD LIKE SOME ADDITIONAL INFORMATION ON WHAT WAS DISCUSSED AS SHE IS CONCERNED HE DID NOT EXPLAIN WHAT WAS GOING ON. SHE STATES HIS LEG WAS HURTING HIM SO BADLY OVER THE WEEKEND THAT HE COULD NOT SIT DOWN AND SHE HAS SOME CONCERNS ABOUT HIS HEALTH AND HIM TAKING THE CONCERNS TOO LIGHTLY. PLEASE CALL TO DISCUSS.

## 2025-07-16 NOTE — ASSESSMENT & PLAN NOTE
Complete nerve study of left lower extremity  Advised patient to frequently change positions and avoid prolonged sitting on hard surfaces  Increase activity  Return if symptoms change or worsen

## 2025-07-25 ENCOUNTER — TELEPHONE (OUTPATIENT)
Dept: FAMILY MEDICINE CLINIC | Facility: CLINIC | Age: 70
End: 2025-07-25
Payer: MEDICARE

## 2025-07-25 DIAGNOSIS — R20.0 LEFT LEG NUMBNESS: Primary | ICD-10-CM

## 2025-07-25 NOTE — TELEPHONE ENCOUNTER
PER SCHEDULING FOR  EMG 1 EXTREMITY - Labeled as clinic performed, needs to be hospital performed to schedule. Please update order

## 2025-08-26 ENCOUNTER — OFFICE VISIT (OUTPATIENT)
Dept: FAMILY MEDICINE CLINIC | Facility: CLINIC | Age: 70
End: 2025-08-26
Payer: MEDICARE

## 2025-08-26 VITALS
TEMPERATURE: 98 F | RESPIRATION RATE: 16 BRPM | HEIGHT: 70 IN | BODY MASS INDEX: 26.77 KG/M2 | WEIGHT: 187 LBS | DIASTOLIC BLOOD PRESSURE: 80 MMHG | SYSTOLIC BLOOD PRESSURE: 134 MMHG

## 2025-08-26 DIAGNOSIS — R53.83 OTHER FATIGUE: ICD-10-CM

## 2025-08-26 DIAGNOSIS — E78.2 MIXED HYPERLIPIDEMIA: ICD-10-CM

## 2025-08-26 DIAGNOSIS — Z87.891 PERSONAL HISTORY OF TOBACCO USE, PRESENTING HAZARDS TO HEALTH: ICD-10-CM

## 2025-08-26 DIAGNOSIS — Z00.00 MEDICARE ANNUAL WELLNESS VISIT, SUBSEQUENT: Primary | ICD-10-CM

## 2025-08-26 DIAGNOSIS — Z12.2 ENCOUNTER FOR SCREENING FOR LUNG CANCER: ICD-10-CM

## 2025-08-26 DIAGNOSIS — F17.210 NICOTINE DEPENDENCE, CIGARETTES, UNCOMPLICATED: ICD-10-CM

## 2025-08-26 DIAGNOSIS — R20.0 LEFT LEG NUMBNESS: ICD-10-CM

## 2025-08-26 DIAGNOSIS — R73.9 HYPERGLYCEMIA: ICD-10-CM

## 2025-08-26 DIAGNOSIS — N52.9 ERECTILE DYSFUNCTION, UNSPECIFIED ERECTILE DYSFUNCTION TYPE: ICD-10-CM

## 2025-08-26 DIAGNOSIS — E29.1 HYPOGONADISM IN MALE: ICD-10-CM

## 2025-08-26 RX ORDER — TADALAFIL 20 MG
20 TABLET ORAL
Qty: 10 TABLET | Refills: 5 | Status: SHIPPED | OUTPATIENT
Start: 2025-08-26

## 2025-08-26 RX ORDER — NAPROXEN SODIUM 220 MG/1
220 TABLET, FILM COATED ORAL EVERY 12 HOURS PRN
COMMUNITY
Start: 2025-08-26

## 2025-08-29 ENCOUNTER — LAB (OUTPATIENT)
Dept: FAMILY MEDICINE CLINIC | Facility: CLINIC | Age: 70
End: 2025-08-29
Payer: MEDICARE

## 2025-08-30 LAB
ALBUMIN SERPL-MCNC: 4.2 G/DL (ref 3.5–5.2)
ALBUMIN/GLOB SERPL: 1.7 G/DL
ALP SERPL-CCNC: 98 U/L (ref 39–117)
ALT SERPL-CCNC: 20 U/L (ref 1–41)
AST SERPL-CCNC: 21 U/L (ref 1–40)
BASOPHILS # BLD AUTO: 0.09 10*3/MM3 (ref 0–0.2)
BASOPHILS NFR BLD AUTO: 0.9 % (ref 0–1.5)
BILIRUB SERPL-MCNC: 0.4 MG/DL (ref 0–1.2)
BUN SERPL-MCNC: 14 MG/DL (ref 8–23)
BUN/CREAT SERPL: 16.5 (ref 7–25)
CALCIUM SERPL-MCNC: 9.6 MG/DL (ref 8.6–10.5)
CHLORIDE SERPL-SCNC: 102 MMOL/L (ref 98–107)
CHOLEST SERPL-MCNC: 199 MG/DL (ref 0–200)
CHOLEST/HDLC SERPL: 4.98 {RATIO}
CO2 SERPL-SCNC: 22.6 MMOL/L (ref 22–29)
CREAT SERPL-MCNC: 0.85 MG/DL (ref 0.76–1.27)
EGFRCR SERPLBLD CKD-EPI 2021: 94.6 ML/MIN/1.73
EOSINOPHIL # BLD AUTO: 0.19 10*3/MM3 (ref 0–0.4)
EOSINOPHIL NFR BLD AUTO: 2 % (ref 0.3–6.2)
ERYTHROCYTE [DISTWIDTH] IN BLOOD BY AUTOMATED COUNT: 13 % (ref 12.3–15.4)
GLOBULIN SER CALC-MCNC: 2.5 GM/DL
GLUCOSE SERPL-MCNC: 103 MG/DL (ref 65–99)
HBA1C MFR BLD: 6.4 % (ref 4.8–5.6)
HCT VFR BLD AUTO: 48.3 % (ref 37.5–51)
HDLC SERPL-MCNC: 40 MG/DL (ref 40–60)
HGB BLD-MCNC: 16.6 G/DL (ref 13–17.7)
IMM GRANULOCYTES # BLD AUTO: 0.04 10*3/MM3 (ref 0–0.05)
IMM GRANULOCYTES NFR BLD AUTO: 0.4 % (ref 0–0.5)
LDLC SERPL CALC-MCNC: 131 MG/DL (ref 0–100)
LYMPHOCYTES # BLD AUTO: 2.82 10*3/MM3 (ref 0.7–3.1)
LYMPHOCYTES NFR BLD AUTO: 29.3 % (ref 19.6–45.3)
MCH RBC QN AUTO: 32.5 PG (ref 26.6–33)
MCHC RBC AUTO-ENTMCNC: 34.4 G/DL (ref 31.5–35.7)
MCV RBC AUTO: 94.5 FL (ref 79–97)
MONOCYTES # BLD AUTO: 0.86 10*3/MM3 (ref 0.1–0.9)
MONOCYTES NFR BLD AUTO: 8.9 % (ref 5–12)
NEUTROPHILS # BLD AUTO: 5.61 10*3/MM3 (ref 1.7–7)
NEUTROPHILS NFR BLD AUTO: 58.5 % (ref 42.7–76)
NRBC BLD AUTO-RTO: 0 /100 WBC (ref 0–0.2)
PLATELET # BLD AUTO: 274 10*3/MM3 (ref 140–450)
POTASSIUM SERPL-SCNC: 4.4 MMOL/L (ref 3.5–5.2)
PROT SERPL-MCNC: 6.7 G/DL (ref 6–8.5)
RBC # BLD AUTO: 5.11 10*6/MM3 (ref 4.14–5.8)
SODIUM SERPL-SCNC: 141 MMOL/L (ref 136–145)
TESTOST SERPL-MCNC: 406 NG/DL (ref 264–916)
TRIGL SERPL-MCNC: 157 MG/DL (ref 0–150)
TSH SERPL DL<=0.005 MIU/L-ACNC: 1.89 UIU/ML (ref 0.27–4.2)
VIT B12 SERPL-MCNC: 318 PG/ML (ref 211–946)
VLDLC SERPL CALC-MCNC: 28 MG/DL (ref 5–40)
WBC # BLD AUTO: 9.61 10*3/MM3 (ref 3.4–10.8)

## (undated) DEVICE — PATIENT RETURN ELECTRODE, SINGLE-USE, CONTACT QUALITY MONITORING, ADULT, WITH 9FT CORD, FOR PATIENTS WEIGING OVER 33LBS. (15KG): Brand: MEGADYNE

## (undated) DEVICE — ENDOPATH XCEL UNIVERSAL TROCAR STABLILITY SLEEVES: Brand: ENDOPATH XCEL

## (undated) DEVICE — SPNG LAP PREWSH SFTPK 18X18IN STRL PK/5

## (undated) DEVICE — ENDOPATH XCEL BLUNT TIP TROCARS WITH SMOOTH SLEEVES: Brand: ENDOPATH XCEL

## (undated) DEVICE — MARYLAND JAW LAPAROSCOPIC SEALER/DIVIDER COATED: Brand: LIGASURE

## (undated) DEVICE — SUT SILK 3/0 SH CR8 18IN C013D

## (undated) DEVICE — ENDOPATH XCEL BLADELESS TROCARS WITH STABILITY SLEEVES: Brand: ENDOPATH XCEL

## (undated) DEVICE — WOUND RETRACTOR AND PROTECTOR: Brand: ALEXIS O WOUND PROTECTOR-RETRACTOR

## (undated) DEVICE — MEDI-VAC YANKAUER SUCTION HANDLE: Brand: CARDINAL HEALTH

## (undated) DEVICE — PK LAP LASR CHOLE 10

## (undated) DEVICE — CLTH CLENS READYCLEANSE PERI CARE PK/5

## (undated) DEVICE — ELECTRD BLD EZ CLN MOD XLNG 2.75IN

## (undated) DEVICE — [HIGH FLOW INSUFFLATOR,  DO NOT USE IF PACKAGE IS DAMAGED,  KEEP DRY,  KEEP AWAY FROM SUNLIGHT,  PROTECT FROM HEAT AND RADIOACTIVE SOURCES.]: Brand: PNEUMOSURE

## (undated) DEVICE — SAFESECURE,SECUREMENT,FOLEY CATH,STERILE: Brand: MEDLINE

## (undated) DEVICE — 30977 SEE SHARP - ENHANCED INTRAOPERATIVE LAPAROSCOPE CLEANING & DEFOGGING: Brand: 30977 SEE SHARP - ENHANCED INTRAOPERATIVE LAPAROSCOPE CLEANING & DEFOGGING

## (undated) DEVICE — ANTIBACTERIAL UNDYED BRAIDED (POLYGLACTIN 910), SYNTHETIC ABSORBABLE SUTURE: Brand: COATED VICRYL

## (undated) DEVICE — PENCL ROCKRSWCH MEGADYNE W/HOLSTR 10FT SS

## (undated) DEVICE — INTENDED FOR TISSUE SEPARATION, AND OTHER PROCEDURES THAT REQUIRE A SHARP SURGICAL BLADE TO PUNCTURE OR CUT.: Brand: BARD-PARKER ® STAINLESS STEEL BLADES

## (undated) DEVICE — ECHELON FLEX POWERED PLUS LONG ARTICULATING ENDOSCOPIC LINEAR CUTTER, 60MM: Brand: ECHELON FLEX

## (undated) DEVICE — GLV SURG SENSICARE PI MIC PF SZ7 LF STRL

## (undated) DEVICE — VISUALIZATION SYSTEM: Brand: CLEARIFY

## (undated) DEVICE — SUT SILK 3/0 TIES 18IN A184H

## (undated) DEVICE — SUT PDS O CT1 CR/8 18IN Z740D

## (undated) DEVICE — SILICONE CLAMP COVERS, STERILE, BLUE, 0.29" (7.40MM) X 5", 2/PKG: Brand: KEY SURGICAL SILICONE CLAMP COVERS

## (undated) DEVICE — TOTAL TRAY, 16FR 10ML SIL FOLEY, URN: Brand: MEDLINE

## (undated) DEVICE — SUT VIC 0 UR6 27IN VCP603H

## (undated) DEVICE — DRSNG SURESITE WNDW 4X4.5

## (undated) DEVICE — SUT SILK 2/0 TIES 18IN A185H

## (undated) DEVICE — 450 ML BOTTLE OF 0.05% CHLORHEXIDINE GLUCONATE IN 99.95% STERILE WATER FOR IRRIGATION, USP AND APPLICATOR.: Brand: IRRISEPT ANTIMICROBIAL WOUND LAVAGE

## (undated) DEVICE — GOWN,PREVENTION PLUS,XXLARGE,STERILE: Brand: MEDLINE

## (undated) DEVICE — ENDOCUT SCISSOR TIP, DISPOSABLE: Brand: RENEW

## (undated) DEVICE — GLV SURG SENSICARE PI MIC PF SZ7.5 LF STRL